# Patient Record
Sex: MALE | Employment: OTHER | URBAN - METROPOLITAN AREA
[De-identification: names, ages, dates, MRNs, and addresses within clinical notes are randomized per-mention and may not be internally consistent; named-entity substitution may affect disease eponyms.]

---

## 2022-10-20 ENCOUNTER — TELEPHONE (OUTPATIENT)
Dept: SURGERY | Age: 87
End: 2022-10-20

## 2022-10-20 NOTE — TELEPHONE ENCOUNTER
80 y/0 patient would like a phone call consultation for his visit as he does not drive and he has no family in the immediate area. His closest child lives in [facility]. Referral in media for anemia. Patient to have EGD/Colonoscopy. Referral in media  Please advise.

## 2022-10-27 NOTE — TELEPHONE ENCOUNTER
Writer called and left a voicemail for patient, we are going to schedule a telephone encounter for this patient

## 2022-10-27 NOTE — TELEPHONE ENCOUNTER
Pt called and stated he was returning a phone call to office. Writer was unable to reach clinical staff. Writer was unsure if she was to schedule appt. Please contact pt.

## 2022-11-02 RX ORDER — POTASSIUM CHLORIDE 750 MG/1
TABLET, FILM COATED, EXTENDED RELEASE ORAL
COMMUNITY
Start: 2022-09-07

## 2022-11-02 RX ORDER — TIOTROPIUM BROMIDE INHALATION SPRAY 1.56 UG/1
SPRAY, METERED RESPIRATORY (INHALATION)
COMMUNITY
Start: 2022-09-07

## 2022-11-02 RX ORDER — LOSARTAN POTASSIUM AND HYDROCHLOROTHIAZIDE 12.5; 1 MG/1; MG/1
TABLET ORAL
COMMUNITY
Start: 2022-09-07

## 2022-11-02 RX ORDER — OMEPRAZOLE 20 MG/1
CAPSULE, DELAYED RELEASE ORAL
COMMUNITY
Start: 2022-09-07

## 2022-11-02 RX ORDER — NIFEDIPINE 90 MG/1
TABLET, FILM COATED, EXTENDED RELEASE ORAL
COMMUNITY
Start: 2022-09-07

## 2022-11-03 ENCOUNTER — SCHEDULED TELEPHONE ENCOUNTER (OUTPATIENT)
Dept: GASTROENTEROLOGY | Age: 87
End: 2022-11-03
Payer: MEDICARE

## 2022-11-03 DIAGNOSIS — E61.1 IRON DEFICIENCY: Primary | ICD-10-CM

## 2022-11-03 PROCEDURE — 99212 OFFICE O/P EST SF 10 MIN: CPT | Performed by: INTERNAL MEDICINE

## 2022-11-03 PROCEDURE — 1123F ACP DISCUSS/DSCN MKR DOCD: CPT | Performed by: INTERNAL MEDICINE

## 2022-11-03 NOTE — PROGRESS NOTES
VIRTUAL VISIT:      Saw Cook is a 80 y.o. male evaluated via telephone on 11/3/2022. Consent:  He and/or health care decision maker is aware that that he may receive a bill for this telephone service, depending on his insurance coverage, and has provided verbal consent to proceed: Yes    Agree with ROS as documented and detailed by MA/LPN in separate note from today's encounter     Documentation:  I communicated with the patient and/or health care decision maker about his anemia. Details of this discussion including any medical advice provided:     Last colonoscopy in 2006  [place]   Patient has no transportation  Unable to be present for clinic visit  Unable to be present for lab work   Unable to arrange transportation for procedures. Difficult to plan work up. No family or friends around  Patient denies any GI symptoms currently  No blood in the stool  Will discuss with social work with regards to arranging blood work/lab work   Recommend fecal occult testing. I affirm this is a Patient Initiated Episode with an Established Patient who has not had a related appointment within my department in the past 7 days or scheduled within the next 24 hours. Total Time: minutes: 11-20 minutes    Saw Cook is a 80 y.o. male being evaluated by a Virtual Visit (telephone visit) encounter to address concerns as mentioned above. A caregiver was present when appropriate. Due to this being a TeleHealth encounter (During TKUPS-91 public health emergency), evaluation of the following organ systems was limited: Vitals/Constitutional/EENT/Resp/CV/GI//MS/Neuro/Skin/Heme-Lymph-Imm.   Pursuant to the emergency declaration under the [facility], [address] [place] [address] authority and the [employer] Act, this Virtual Visit was conducted with patient's (and/or legal guardian's) consent, to reduce the patient's risk of exposure to COVID-19 and provide necessary medical care. The patient (and/or legal guardian) has also been advised to contact this office for worsening conditions or problems, and seek emergency medical treatment and/or call 911 if deemed necessary. Services were provided through a telephone synchronous discussion virtually to substitute for in-person clinic visit. Patient and provider were located at their individual homes. --Phan Wolff MD on 11/3/2022 at 4:30 PM    An electronic signature was used to authenticate this note.        Phan Wolff MD   Gastroenterology

## 2024-05-28 ENCOUNTER — HOSPITAL ENCOUNTER (INPATIENT)
Age: 89
LOS: 7 days | Discharge: NURSING, REHAB OR LONG-TERM CARE | DRG: 808 | End: 2024-06-04
Attending: EMERGENCY MEDICINE | Admitting: STUDENT IN AN ORGANIZED HEALTH CARE EDUCATION/TRAINING PROGRAM
Payer: MEDICARE

## 2024-05-28 ENCOUNTER — HOSPITAL ENCOUNTER (OUTPATIENT)
Age: 89
Discharge: HOME | End: 2024-05-28
Payer: MEDICARE

## 2024-05-28 ENCOUNTER — APPOINTMENT (OUTPATIENT)
Dept: GENERAL RADIOLOGY | Age: 89
DRG: 808 | End: 2024-05-28
Payer: MEDICARE

## 2024-05-28 ENCOUNTER — HOSPITAL ENCOUNTER (OUTPATIENT)
Dept: GENERAL RADIOLOGY | Age: 89
Discharge: HOME | End: 2024-05-30
Payer: MEDICARE

## 2024-05-28 DIAGNOSIS — N17.9 AKI (ACUTE KIDNEY INJURY) (HCC): ICD-10-CM

## 2024-05-28 DIAGNOSIS — D70.9 NEUTROPENIA, UNSPECIFIED TYPE (HCC): Primary | ICD-10-CM

## 2024-05-28 DIAGNOSIS — R79.89 ELEVATED TROPONIN: ICD-10-CM

## 2024-05-28 DIAGNOSIS — D64.9 ANEMIA, UNSPECIFIED TYPE: ICD-10-CM

## 2024-05-28 DIAGNOSIS — R06.02 SHORTNESS OF BREATH: ICD-10-CM

## 2024-05-28 DIAGNOSIS — R79.89 ELEVATED BRAIN NATRIURETIC PEPTIDE (BNP) LEVEL: ICD-10-CM

## 2024-05-28 PROBLEM — J44.9 COPD (CHRONIC OBSTRUCTIVE PULMONARY DISEASE) (HCC): Status: ACTIVE | Noted: 2024-05-28

## 2024-05-28 PROBLEM — D61.818 PANCYTOPENIA (HCC): Status: ACTIVE | Noted: 2024-05-28

## 2024-05-28 PROBLEM — I10 HYPERTENSION: Status: ACTIVE | Noted: 2024-05-28

## 2024-05-28 LAB
ALBUMIN SERPL-MCNC: 4 G/DL (ref 3.5–5.2)
ALBUMIN/GLOB SERPL: 2 {RATIO} (ref 1–2.5)
ALP SERPL-CCNC: 71 U/L (ref 40–129)
ALT SERPL-CCNC: 16 U/L (ref 10–50)
ANION GAP SERPL CALCULATED.3IONS-SCNC: 15 MMOL/L (ref 9–16)
AST SERPL-CCNC: 21 U/L (ref 10–50)
BASOPHILS # BLD: 0 K/UL (ref 0–0.2)
BASOPHILS NFR BLD: 0 % (ref 0–2)
BILIRUB SERPL-MCNC: 0.6 MG/DL (ref 0–1.2)
BNP SERPL-MCNC: 1010 PG/ML (ref 0–300)
BUN SERPL-MCNC: 42 MG/DL (ref 8–23)
CALCIUM SERPL-MCNC: 9.3 MG/DL (ref 8.6–10.4)
CHLORIDE SERPL-SCNC: 104 MMOL/L (ref 98–107)
CO2 SERPL-SCNC: 21 MMOL/L (ref 20–31)
CREAT SERPL-MCNC: 2.5 MG/DL (ref 0.7–1.2)
D DIMER PPP FEU-MCNC: 1.21 UG/ML FEU
EOSINOPHIL # BLD: 0.02 K/UL (ref 0–0.4)
EOSINOPHILS RELATIVE PERCENT: 2 % (ref 1–4)
ERYTHROCYTE [DISTWIDTH] IN BLOOD BY AUTOMATED COUNT: 16.3 % (ref 11.8–14.4)
GFR, ESTIMATED: 23 ML/MIN/1.73M2
GLUCOSE SERPL-MCNC: 105 MG/DL (ref 74–99)
HCT VFR BLD AUTO: 25 % (ref 40.7–50.3)
HGB BLD-MCNC: 7.8 G/DL (ref 13–17)
IMM GRANULOCYTES # BLD AUTO: 0.02 K/UL (ref 0–0.3)
IMM GRANULOCYTES NFR BLD: 2 %
LYMPHOCYTES NFR BLD: 0.68 K/UL (ref 1–4.8)
LYMPHOCYTES RELATIVE PERCENT: 62 % (ref 24–44)
MAGNESIUM SERPL-MCNC: 2.2 MG/DL (ref 1.7–2.3)
MCH RBC QN AUTO: 33.2 PG (ref 25.2–33.5)
MCHC RBC AUTO-ENTMCNC: 31.2 G/DL (ref 28.4–34.8)
MCV RBC AUTO: 106.4 FL (ref 82.6–102.9)
MONOCYTES NFR BLD: 0.07 K/UL (ref 0.1–0.8)
MONOCYTES NFR BLD: 6 % (ref 1–7)
MORPHOLOGY: ABNORMAL
MORPHOLOGY: ABNORMAL
NEUTROPHILS NFR BLD: 28 % (ref 36–66)
NEUTS SEG NFR BLD: 0.31 K/UL (ref 1.8–7.7)
NRBC BLD-RTO: 1.8 PER 100 WBC
PLATELET # BLD AUTO: 135 K/UL (ref 138–453)
PMV BLD AUTO: 10.4 FL (ref 8.1–13.5)
POTASSIUM SERPL-SCNC: 4.5 MMOL/L (ref 3.7–5.3)
PROT SERPL-MCNC: 6.5 G/DL (ref 6.6–8.7)
RBC # BLD AUTO: 2.35 M/UL (ref 4.21–5.77)
SODIUM SERPL-SCNC: 140 MMOL/L (ref 136–145)
T4 FREE SERPL-MCNC: 1.4 NG/DL (ref 0.92–1.68)
TROPONIN I SERPL HS-MCNC: 49 NG/L (ref 0–22)
TROPONIN I SERPL HS-MCNC: 53 NG/L (ref 0–22)
TSH SERPL DL<=0.05 MIU/L-ACNC: 0.73 UIU/ML (ref 0.3–5)
WBC OTHER # BLD: 1.1 K/UL (ref 3.5–11.3)

## 2024-05-28 PROCEDURE — 86301 IMMUNOASSAY TUMOR CA 19-9: CPT

## 2024-05-28 PROCEDURE — 83735 ASSAY OF MAGNESIUM: CPT

## 2024-05-28 PROCEDURE — 84300 ASSAY OF URINE SODIUM: CPT

## 2024-05-28 PROCEDURE — 71046 X-RAY EXAM CHEST 2 VIEWS: CPT

## 2024-05-28 PROCEDURE — 84484 ASSAY OF TROPONIN QUANT: CPT

## 2024-05-28 PROCEDURE — 99285 EMERGENCY DEPT VISIT HI MDM: CPT

## 2024-05-28 PROCEDURE — 85379 FIBRIN DEGRADATION QUANT: CPT

## 2024-05-28 PROCEDURE — 1200000000 HC SEMI PRIVATE

## 2024-05-28 PROCEDURE — 84156 ASSAY OF PROTEIN URINE: CPT

## 2024-05-28 PROCEDURE — 71045 X-RAY EXAM CHEST 1 VIEW: CPT

## 2024-05-28 PROCEDURE — 83550 IRON BINDING TEST: CPT

## 2024-05-28 PROCEDURE — 83880 ASSAY OF NATRIURETIC PEPTIDE: CPT

## 2024-05-28 PROCEDURE — 81001 URINALYSIS AUTO W/SCOPE: CPT

## 2024-05-28 PROCEDURE — 93005 ELECTROCARDIOGRAM TRACING: CPT | Performed by: NURSE PRACTITIONER

## 2024-05-28 PROCEDURE — 84439 ASSAY OF FREE THYROXINE: CPT

## 2024-05-28 PROCEDURE — 83540 ASSAY OF IRON: CPT

## 2024-05-28 PROCEDURE — 99222 1ST HOSP IP/OBS MODERATE 55: CPT

## 2024-05-28 PROCEDURE — 36415 COLL VENOUS BLD VENIPUNCTURE: CPT

## 2024-05-28 PROCEDURE — 2580000003 HC RX 258: Performed by: NURSE PRACTITIONER

## 2024-05-28 PROCEDURE — 84443 ASSAY THYROID STIM HORMONE: CPT

## 2024-05-28 PROCEDURE — 80053 COMPREHEN METABOLIC PANEL: CPT

## 2024-05-28 PROCEDURE — 2580000003 HC RX 258

## 2024-05-28 PROCEDURE — 85025 COMPLETE CBC W/AUTO DIFF WBC: CPT

## 2024-05-28 RX ORDER — ONDANSETRON 4 MG/1
4 TABLET, ORALLY DISINTEGRATING ORAL EVERY 8 HOURS PRN
Status: DISCONTINUED | OUTPATIENT
Start: 2024-05-28 | End: 2024-06-04 | Stop reason: HOSPADM

## 2024-05-28 RX ORDER — TAMSULOSIN HYDROCHLORIDE 0.4 MG/1
0.4 CAPSULE ORAL DAILY
Status: DISCONTINUED | OUTPATIENT
Start: 2024-05-29 | End: 2024-06-04 | Stop reason: HOSPADM

## 2024-05-28 RX ORDER — ONDANSETRON 2 MG/ML
4 INJECTION INTRAMUSCULAR; INTRAVENOUS EVERY 6 HOURS PRN
Status: DISCONTINUED | OUTPATIENT
Start: 2024-05-28 | End: 2024-06-04 | Stop reason: HOSPADM

## 2024-05-28 RX ORDER — PANTOPRAZOLE SODIUM 40 MG/1
40 TABLET, DELAYED RELEASE ORAL
Status: DISCONTINUED | OUTPATIENT
Start: 2024-05-29 | End: 2024-05-29

## 2024-05-28 RX ORDER — SODIUM CHLORIDE 9 MG/ML
INJECTION, SOLUTION INTRAVENOUS CONTINUOUS
Status: DISCONTINUED | OUTPATIENT
Start: 2024-05-28 | End: 2024-05-29

## 2024-05-28 RX ORDER — NIFEDIPINE 30 MG/1
90 TABLET, EXTENDED RELEASE ORAL DAILY
Status: DISCONTINUED | OUTPATIENT
Start: 2024-05-29 | End: 2024-05-29

## 2024-05-28 RX ORDER — 0.9 % SODIUM CHLORIDE 0.9 %
500 INTRAVENOUS SOLUTION INTRAVENOUS ONCE
Status: COMPLETED | OUTPATIENT
Start: 2024-05-28 | End: 2024-05-28

## 2024-05-28 RX ADMIN — SODIUM CHLORIDE: 9 INJECTION, SOLUTION INTRAVENOUS at 23:15

## 2024-05-28 RX ADMIN — SODIUM CHLORIDE 500 ML: 9 INJECTION, SOLUTION INTRAVENOUS at 20:31

## 2024-05-28 ASSESSMENT — PAIN - FUNCTIONAL ASSESSMENT: PAIN_FUNCTIONAL_ASSESSMENT: NONE - DENIES PAIN

## 2024-05-28 NOTE — ED PROVIDER NOTES
[facility] EMERGENCY DEPARTMENT  EMERGENCY DEPARTMENT ENCOUNTER      Pt Name: Shashi Rodriguez  MRN: [id number]  Birthdate 2/19/1932  Date of evaluation: 5/28/2024  Provider: KENZIE Johnson CNP  8:37 PM    CHIEF COMPLAINT       Chief Complaint   Patient presents with    Shortness of Breath     Sent to ED by PCP, abnormal labs today (low hgb, low wbc...); pt reports \"things are just slowing down\", SOB, \"blurry eyes\" X 2 weeks;          HISTORY OF PRESENT ILLNESS    Shashi Rodriguez is a 89 y.o. male who presents to the emergency department for evaluation of fatigue for 2 weeks.  States that he went to his primary care doctor and had some blood work drawn today and was called and told that it was abnormal.  No chest pain no difficulty breathing but does report occasional shortness of breath and feeling lightheaded when he changes positions.  No difficulty with bowel or bladder no abdominal pain nausea vomiting no headache.  No history of DVT PE or MI.    HPI    Nursing Notes were reviewed.    REVIEW OF SYSTEMS       Review of Systems   All other systems reviewed and are negative.      Except as noted above the remainder of the review of systems was reviewed and negative.       PAST MEDICAL HISTORY     Past Medical History:   Diagnosis Date    Hernia 1986    Hospitalized at [facility]    Kidney stones 1982; 1988    Hospitalized at [facility]    Sleep apnea 8/2006    Dr. amanda Kirby/Dr. Javier Alarcon         SURGICAL HISTORY       Past Surgical History:   Procedure Laterality Date    CATARACT REMOVAL      Right 11/2006; Left 4/2007    [facility]    TOTAL KNEE ARTHROPLASTY  10/2005    Bilateral total knee    VASECTOMY  1978    [facility] (Out[facility]         CURRENT MEDICATIONS       Previous Medications    ALFUZOSIN (UROXATRAL) 10 MG SR TABLET    Take 10 mg by mouth daily.    FUROSEMIDE (LASIX) 40 MG TABLET    Take 40 mg by mouth 2 times daily.    LOSARTAN

## 2024-05-29 ENCOUNTER — APPOINTMENT (OUTPATIENT)
Dept: ULTRASOUND IMAGING | Age: 89
DRG: 808 | End: 2024-05-29
Attending: INTERNAL MEDICINE
Payer: MEDICARE

## 2024-05-29 ENCOUNTER — APPOINTMENT (OUTPATIENT)
Dept: GENERAL RADIOLOGY | Age: 89
DRG: 808 | End: 2024-05-29
Payer: MEDICARE

## 2024-05-29 ENCOUNTER — APPOINTMENT (OUTPATIENT)
Dept: ULTRASOUND IMAGING | Age: 89
DRG: 808 | End: 2024-05-29
Payer: MEDICARE

## 2024-05-29 PROBLEM — T17.908A ASPIRATION INTO AIRWAY: Status: ACTIVE | Noted: 2024-05-29

## 2024-05-29 PROBLEM — R16.1 SPLENOMEGALY: Status: ACTIVE | Noted: 2024-05-29

## 2024-05-29 PROBLEM — N18.4 STAGE 4 CHRONIC KIDNEY DISEASE (HCC): Status: ACTIVE | Noted: 2024-05-29

## 2024-05-29 PROBLEM — R13.10 DYSPHAGIA: Status: ACTIVE | Noted: 2024-05-29

## 2024-05-29 PROBLEM — E87.20 NORMAL ANION GAP METABOLIC ACIDOSIS: Status: ACTIVE | Noted: 2024-05-29

## 2024-05-29 PROBLEM — N40.1 BENIGN PROSTATIC HYPERPLASIA WITH LOWER URINARY TRACT SYMPTOMS: Status: ACTIVE | Noted: 2024-05-29

## 2024-05-29 PROBLEM — N17.9 AKI (ACUTE KIDNEY INJURY) (HCC): Status: ACTIVE | Noted: 2024-05-29

## 2024-05-29 PROBLEM — K74.69 OTHER CIRRHOSIS OF LIVER (HCC): Status: ACTIVE | Noted: 2024-05-29

## 2024-05-29 PROBLEM — T17.908A ASPIRATION INTO AIRWAY: Status: RESOLVED | Noted: 2024-05-29 | Resolved: 2024-05-29

## 2024-05-29 LAB
ALBUMIN SERPL-MCNC: 3.4 G/DL (ref 3.5–5.2)
ALBUMIN/GLOB SERPL: 1.4 {RATIO} (ref 1–2.5)
ALP SERPL-CCNC: 63 U/L (ref 40–129)
ALT SERPL-CCNC: 14 U/L (ref 5–41)
ANION GAP SERPL CALCULATED.3IONS-SCNC: 12 MMOL/L (ref 9–17)
AST SERPL-CCNC: 16 U/L
BACTERIA URNS QL MICRO: ABNORMAL
BASOPHILS # BLD: 0 K/UL (ref 0–0.2)
BASOPHILS # BLD: 0.02 K/UL (ref 0–0.2)
BASOPHILS NFR BLD: 0 % (ref 0–2)
BASOPHILS NFR BLD: 2 % (ref 0–2)
BILIRUB SERPL-MCNC: 0.5 MG/DL (ref 0.3–1.2)
BILIRUB UR QL STRIP: NEGATIVE
BUN SERPL-MCNC: 37 MG/DL (ref 8–23)
CALCIUM SERPL-MCNC: 8.9 MG/DL (ref 8.6–10.4)
CANCER AG19-9 SERPL IA-ACNC: 8 U/ML (ref 0–35)
CELLS COUNTED: 50
CELLS COUNTED: 50
CHLORIDE SERPL-SCNC: 110 MMOL/L (ref 98–107)
CLARITY UR: CLEAR
CO2 SERPL-SCNC: 19 MMOL/L (ref 20–31)
COLOR UR: YELLOW
CREAT SERPL-MCNC: 2.1 MG/DL (ref 0.7–1.2)
EKG ATRIAL RATE: 79 BPM
EKG P AXIS: 16 DEGREES
EKG P-R INTERVAL: 210 MS
EKG Q-T INTERVAL: 402 MS
EKG QRS DURATION: 122 MS
EKG QTC CALCULATION (BAZETT): 460 MS
EKG R AXIS: 65 DEGREES
EKG T AXIS: 14 DEGREES
EKG VENTRICULAR RATE: 79 BPM
EOSINOPHIL # BLD: 0 K/UL (ref 0–0.4)
EOSINOPHIL # BLD: 0.02 K/UL (ref 0–0.4)
EOSINOPHILS RELATIVE PERCENT: 0 % (ref 1–4)
EOSINOPHILS RELATIVE PERCENT: 2 % (ref 1–4)
EPI CELLS #/AREA URNS HPF: ABNORMAL /HPF (ref 0–5)
ERYTHROCYTE [DISTWIDTH] IN BLOOD BY AUTOMATED COUNT: 16.4 % (ref 12.5–15.4)
ERYTHROCYTE [DISTWIDTH] IN BLOOD BY AUTOMATED COUNT: 16.4 % (ref 12.5–15.4)
FERRITIN SERPL-MCNC: 190 NG/ML (ref 30–400)
FOLATE SERPL-MCNC: >20 NG/ML (ref 4.8–24.2)
GFR, ESTIMATED: 29 ML/MIN/1.73M2
GLUCOSE SERPL-MCNC: 97 MG/DL (ref 70–99)
GLUCOSE UR STRIP-MCNC: NEGATIVE MG/DL
HCT VFR BLD AUTO: 22 % (ref 41–53)
HCT VFR BLD AUTO: 22 % (ref 41–53)
HGB BLD-MCNC: 7.4 G/DL (ref 13.5–17.5)
HGB BLD-MCNC: 7.4 G/DL (ref 13.5–17.5)
HGB UR QL STRIP.AUTO: NEGATIVE
IMM RETICS NFR: 19.4 % (ref 2.7–18.3)
IMM RETICS NFR: 29.3 % (ref 2.7–18.3)
IRON SATN MFR SERPL: 8 % (ref 20–55)
IRON SERPL-MCNC: 18 UG/DL (ref 61–157)
KETONES UR STRIP-MCNC: NEGATIVE MG/DL
LDH SERPL-CCNC: 225 U/L (ref 135–225)
LEUKOCYTE ESTERASE UR QL STRIP: NEGATIVE
LYMPHOCYTES NFR BLD: 0.66 K/UL (ref 1–4.8)
LYMPHOCYTES NFR BLD: 0.79 K/UL (ref 1–4.8)
LYMPHOCYTES RELATIVE PERCENT: 66 % (ref 24–44)
LYMPHOCYTES RELATIVE PERCENT: 72 % (ref 24–44)
MAGNESIUM SERPL-MCNC: 2.1 MG/DL (ref 1.6–2.6)
MCH RBC QN AUTO: 34 PG (ref 26–34)
MCH RBC QN AUTO: 34.1 PG (ref 26–34)
MCHC RBC AUTO-ENTMCNC: 33.5 G/DL (ref 31–37)
MCHC RBC AUTO-ENTMCNC: 33.7 G/DL (ref 31–37)
MCV RBC AUTO: 101.2 FL (ref 80–100)
MCV RBC AUTO: 101.3 FL (ref 80–100)
MONOCYTES NFR BLD: 0.09 K/UL (ref 0.1–0.8)
MONOCYTES NFR BLD: 0.12 K/UL (ref 0.1–0.8)
MONOCYTES NFR BLD: 12 % (ref 1–7)
MONOCYTES NFR BLD: 8 % (ref 1–7)
MORPHOLOGY: ABNORMAL
MORPHOLOGY: ABNORMAL
MYELOCYTES ABSOLUTE COUNT: 0.02 K/UL
MYELOCYTES: 2 %
NEUTROPHILS NFR BLD: 16 % (ref 36–66)
NEUTROPHILS NFR BLD: 20 % (ref 36–66)
NEUTS SEG NFR BLD: 0.18 K/UL (ref 1.8–7.7)
NEUTS SEG NFR BLD: 0.2 K/UL (ref 1.8–7.7)
NITRITE UR QL STRIP: NEGATIVE
NUCLEATED RED BLOOD CELLS: 2 PER 100 WBC
PH UR STRIP: 6 [PH] (ref 5–8)
PLATELET # BLD AUTO: 149 K/UL (ref 140–450)
PLATELET # BLD AUTO: 149 K/UL (ref 140–450)
PMV BLD AUTO: 7.9 FL (ref 6–12)
PMV BLD AUTO: 7.9 FL (ref 6–12)
POTASSIUM SERPL-SCNC: 4.7 MMOL/L (ref 3.7–5.3)
PROT SERPL-MCNC: 5.9 G/DL (ref 6.4–8.3)
PROT UR STRIP-MCNC: NEGATIVE MG/DL
PSA SERPL-MCNC: 7.5 NG/ML (ref 0–4)
RBC # BLD AUTO: 2.17 M/UL (ref 4.5–5.9)
RBC # BLD AUTO: 2.18 M/UL (ref 4.5–5.9)
RBC #/AREA URNS HPF: ABNORMAL /HPF (ref 0–2)
RETIC HEMOGLOBIN: 24.8 PG (ref 28.2–35.7)
RETIC HEMOGLOBIN: 25.5 PG (ref 28.2–35.7)
RETICS # AUTO: 0.06 M/UL (ref 0.03–0.08)
RETICS # AUTO: 0.07 M/UL (ref 0.03–0.08)
RETICS/RBC NFR AUTO: 2.6 % (ref 0.5–1.9)
RETICS/RBC NFR AUTO: 3 % (ref 0.5–1.9)
SODIUM SERPL-SCNC: 141 MMOL/L (ref 135–144)
SODIUM UR-SCNC: 93 MMOL/L
SP GR UR STRIP: 1.01 (ref 1–1.03)
TIBC SERPL-MCNC: 237 UG/DL (ref 250–450)
TOTAL PROTEIN, URINE: <4 MG/DL
UNSATURATED IRON BINDING CAPACITY: 219 UG/DL (ref 112–347)
UROBILINOGEN UR STRIP-ACNC: NORMAL EU/DL (ref 0–1)
VIT B12 SERPL-MCNC: 1315 PG/ML (ref 232–1245)
WBC #/AREA URNS HPF: ABNORMAL /HPF (ref 0–5)
WBC OTHER # BLD: 1 K/UL (ref 3.5–11)
WBC OTHER # BLD: 1.1 K/UL (ref 3.5–11)

## 2024-05-29 PROCEDURE — 51798 US URINE CAPACITY MEASURE: CPT

## 2024-05-29 PROCEDURE — 6360000002 HC RX W HCPCS: Performed by: STUDENT IN AN ORGANIZED HEALTH CARE EDUCATION/TRAINING PROGRAM

## 2024-05-29 PROCEDURE — 99223 1ST HOSP IP/OBS HIGH 75: CPT | Performed by: INTERNAL MEDICINE

## 2024-05-29 PROCEDURE — 71045 X-RAY EXAM CHEST 1 VIEW: CPT

## 2024-05-29 PROCEDURE — 83615 LACTATE (LD) (LDH) ENZYME: CPT

## 2024-05-29 PROCEDURE — 85045 AUTOMATED RETICULOCYTE COUNT: CPT

## 2024-05-29 PROCEDURE — 84165 PROTEIN E-PHORESIS SERUM: CPT

## 2024-05-29 PROCEDURE — G0103 PSA SCREENING: HCPCS

## 2024-05-29 PROCEDURE — 6370000000 HC RX 637 (ALT 250 FOR IP)

## 2024-05-29 PROCEDURE — 82607 VITAMIN B-12: CPT

## 2024-05-29 PROCEDURE — 99232 SBSQ HOSP IP/OBS MODERATE 35: CPT | Performed by: STUDENT IN AN ORGANIZED HEALTH CARE EDUCATION/TRAINING PROGRAM

## 2024-05-29 PROCEDURE — 83735 ASSAY OF MAGNESIUM: CPT

## 2024-05-29 PROCEDURE — 82728 ASSAY OF FERRITIN: CPT

## 2024-05-29 PROCEDURE — 84155 ASSAY OF PROTEIN SERUM: CPT

## 2024-05-29 PROCEDURE — 36415 COLL VENOUS BLD VENIPUNCTURE: CPT

## 2024-05-29 PROCEDURE — 87040 BLOOD CULTURE FOR BACTERIA: CPT

## 2024-05-29 PROCEDURE — 83521 IG LIGHT CHAINS FREE EACH: CPT

## 2024-05-29 PROCEDURE — 80053 COMPREHEN METABOLIC PANEL: CPT

## 2024-05-29 PROCEDURE — 85025 COMPLETE CBC W/AUTO DIFF WBC: CPT

## 2024-05-29 PROCEDURE — 1200000000 HC SEMI PRIVATE

## 2024-05-29 PROCEDURE — 6370000000 HC RX 637 (ALT 250 FOR IP): Performed by: STUDENT IN AN ORGANIZED HEALTH CARE EDUCATION/TRAINING PROGRAM

## 2024-05-29 PROCEDURE — 84145 PROCALCITONIN (PCT): CPT

## 2024-05-29 PROCEDURE — 76705 ECHO EXAM OF ABDOMEN: CPT

## 2024-05-29 PROCEDURE — 94761 N-INVAS EAR/PLS OXIMETRY MLT: CPT

## 2024-05-29 PROCEDURE — 76770 US EXAM ABDO BACK WALL COMP: CPT

## 2024-05-29 PROCEDURE — 82746 ASSAY OF FOLIC ACID SERUM: CPT

## 2024-05-29 PROCEDURE — 92610 EVALUATE SWALLOWING FUNCTION: CPT

## 2024-05-29 PROCEDURE — 94640 AIRWAY INHALATION TREATMENT: CPT

## 2024-05-29 PROCEDURE — 88184 FLOWCYTOMETRY/ TC 1 MARKER: CPT

## 2024-05-29 PROCEDURE — 51702 INSERT TEMP BLADDER CATH: CPT

## 2024-05-29 PROCEDURE — 86334 IMMUNOFIX E-PHORESIS SERUM: CPT

## 2024-05-29 PROCEDURE — 2580000003 HC RX 258: Performed by: STUDENT IN AN ORGANIZED HEALTH CARE EDUCATION/TRAINING PROGRAM

## 2024-05-29 PROCEDURE — 88185 FLOWCYTOMETRY/TC ADD-ON: CPT

## 2024-05-29 RX ORDER — 0.9 % SODIUM CHLORIDE 0.9 %
500 INTRAVENOUS SOLUTION INTRAVENOUS ONCE
Status: COMPLETED | OUTPATIENT
Start: 2024-05-29 | End: 2024-05-29

## 2024-05-29 RX ORDER — GUAIFENESIN AND DEXTROMETHORPHAN HYDROBROMIDE 100; 10 MG/5ML; MG/5ML
5 SOLUTION ORAL EVERY 4 HOURS PRN
Status: DISCONTINUED | OUTPATIENT
Start: 2024-05-29 | End: 2024-06-04 | Stop reason: HOSPADM

## 2024-05-29 RX ORDER — SODIUM CHLORIDE 450 MG/100ML
INJECTION, SOLUTION INTRAVENOUS CONTINUOUS
Status: DISCONTINUED | OUTPATIENT
Start: 2024-05-29 | End: 2024-05-29

## 2024-05-29 RX ORDER — MIDODRINE HYDROCHLORIDE 5 MG/1
5 TABLET ORAL 3 TIMES DAILY PRN
Status: DISCONTINUED | OUTPATIENT
Start: 2024-05-29 | End: 2024-06-04 | Stop reason: HOSPADM

## 2024-05-29 RX ORDER — MIDODRINE HYDROCHLORIDE 2.5 MG/1
TABLET ORAL
Status: DISPENSED
Start: 2024-05-29 | End: 2024-05-29

## 2024-05-29 RX ADMIN — CEFEPIME 1000 MG: 1 INJECTION, POWDER, FOR SOLUTION INTRAMUSCULAR; INTRAVENOUS at 22:14

## 2024-05-29 RX ADMIN — CEFEPIME 2000 MG: 2 INJECTION, POWDER, FOR SOLUTION INTRAVENOUS at 09:06

## 2024-05-29 RX ADMIN — TAMSULOSIN HYDROCHLORIDE 0.4 MG: 0.4 CAPSULE ORAL at 08:01

## 2024-05-29 RX ADMIN — SODIUM CHLORIDE 500 ML: 9 INJECTION, SOLUTION INTRAVENOUS at 07:56

## 2024-05-29 RX ADMIN — PANTOPRAZOLE SODIUM 40 MG: 40 TABLET, DELAYED RELEASE ORAL at 06:16

## 2024-05-29 RX ADMIN — GUAIFENESIN AND DEXTROMETHORPHAN 5 ML: 20; 200 SYRUP ORAL at 22:19

## 2024-05-29 RX ADMIN — TIOTROPIUM BROMIDE INHALATION SPRAY 1 PUFF: 3.12 SPRAY, METERED RESPIRATORY (INHALATION) at 08:10

## 2024-05-29 RX ADMIN — MIDODRINE HYDROCHLORIDE 5 MG: 2.5 TABLET ORAL at 08:01

## 2024-05-29 RX ADMIN — SODIUM CHLORIDE: 4.5 INJECTION, SOLUTION INTRAVENOUS at 09:01

## 2024-05-29 NOTE — ED PROVIDER NOTES
[facility] Emergency Department  [address]  [facility] [address]  Phone: [phone]  Fax: [phone]      Attending Physician Attestation          CHIEF COMPLAINT       Chief Complaint   Patient presents with    Shortness of Breath     Sent to ED by PCP, abnormal labs today (low hgb, low wbc...); pt reports \"things are just slowing down\", SOB, \"blurry eyes\" X 2 weeks;        DIAGNOSTIC RESULTS     LABS:  Labs Reviewed   TROPONIN - Abnormal; Notable for the following components:       Result Value    Troponin, High Sensitivity 53 (*)     All other components within normal limits   TROPONIN - Abnormal; Notable for the following components:    Troponin, High Sensitivity 49 (*)     All other components within normal limits   TSH   T4, FREE   URINALYSIS WITH REFLEX TO CULTURE       All other labs were within normal range or not returned as of this dictation.    RADIOLOGY:  XR CHEST PORTABLE   Final Result   No acute cardiopulmonary disease.               EMERGENCY DEPARTMENT COURSE:   Vitals:    Vitals:    05/28/24 2028 05/28/24 2030 05/28/24 2050 05/28/24 2120   BP: (!) 101/50 (!) 101/50 (!) 96/51 (!) 111/54   Pulse:  75 74 83   Resp:  15 13 26   Temp:       TempSrc:       SpO2:  95% 96% 93%   Weight:       Height:         -------------------------  BP: (!) 111/54, Temp: 98.1 °F (36.7 °C), Pulse: 83, Respirations: 26             PERTINENT ATTENDING PHYSICIAN COMMENTS:    I performed a history and physical examination of the patient and discussed management with the mid level provider. I reviewed the mid level provider's note and agree with the documented findings and plan of care. Any areas of disagreement are noted on the chart. I was personally present for the key portions of any procedures. I have documented in the chart those procedures where I was not present during the key portions. I have reviewed the emergency nurses triage note. I agree with the chief complaint, past

## 2024-05-29 NOTE — H&P
Metabolic Panel    Collection Time: 05/28/24 11:54 AM   Result Value Ref Range    Sodium 140 136 - 145 mmol/L    Potassium 4.5 3.7 - 5.3 mmol/L    Chloride 104 98 - 107 mmol/L    CO2 21 20 - 31 mmol/L    Anion Gap 15 9 - 16 mmol/L    Glucose 105 (H) 74 - 99 mg/dL    BUN 42 (H) 8 - 23 mg/dL    Creatinine 2.5 (H) 0.70 - 1.20 mg/dL    Est, Glom Filt Rate 23 (L) >60 mL/min/1.73m2    Calcium 9.3 8.6 - 10.4 mg/dL    Total Protein 6.5 (L) 6.6 - 8.7 g/dL    Albumin 4.0 3.5 - 5.2 g/dL    Albumin/Globulin Ratio 2.0 1.0 - 2.5    Total Bilirubin 0.6 0.00 - 1.20 mg/dL    Alkaline Phosphatase 71 40 - 129 U/L    ALT 16 10 - 50 U/L    AST 21 10 - 50 U/L   D-Dimer, Quantitative    Collection Time: 05/28/24 11:54 AM   Result Value Ref Range    D-Dimer, Quant 1.21 ug/mL FEU   Magnesium    Collection Time: 05/28/24 11:54 AM   Result Value Ref Range    Magnesium 2.2 1.7 - 2.3 mg/dL   EKG 12 Lead (Chest Pain)    Collection Time: 05/28/24  7:37 PM   Result Value Ref Range    Ventricular Rate 79 BPM    Atrial Rate 79 BPM    P-R Interval 210 ms    QRS Duration 122 ms    Q-T Interval 402 ms    QTc Calculation (Bazett) 460 ms    P Axis 16 degrees    R Axis 65 degrees    T Axis 14 degrees   Troponin    Collection Time: 05/28/24  7:41 PM   Result Value Ref Range    Troponin, High Sensitivity 53 (HH) 0 - 22 ng/L   TSH    Collection Time: 05/28/24  7:41 PM   Result Value Ref Range    TSH 0.73 0.30 - 5.00 uIU/mL   T4, Free    Collection Time: 05/28/24  7:41 PM   Result Value Ref Range    T4 Free 1.4 0.92 - 1.68 ng/dL   Troponin    Collection Time: 05/28/24  9:08 PM   Result Value Ref Range    Troponin, High Sensitivity 49 (H) 0 - 22 ng/L   Urinalysis with microscopic    Collection Time: 05/28/24 11:45 PM   Result Value Ref Range    Color, UA Yellow Yellow    Turbidity UA Clear Clear    Glucose, Ur NEGATIVE NEGATIVE mg/dL    Bilirubin, Urine NEGATIVE NEGATIVE    Ketones, Urine NEGATIVE NEGATIVE mg/dL    Specific Gravity, UA 1.010 1.005 - 1.030

## 2024-05-30 ENCOUNTER — APPOINTMENT (OUTPATIENT)
Age: 89
DRG: 808 | End: 2024-05-30
Attending: STUDENT IN AN ORGANIZED HEALTH CARE EDUCATION/TRAINING PROGRAM
Payer: MEDICARE

## 2024-05-30 PROBLEM — D64.9 ANEMIA: Status: ACTIVE | Noted: 2024-05-28

## 2024-05-30 PROBLEM — D70.9 NEUTROPENIA (HCC): Status: ACTIVE | Noted: 2024-05-30

## 2024-05-30 LAB
ANION GAP SERPL CALCULATED.3IONS-SCNC: 12 MMOL/L (ref 9–17)
BASOPHILS # BLD: 0 K/UL (ref 0–0.2)
BASOPHILS NFR BLD: 0 % (ref 0–2)
BUN SERPL-MCNC: 28 MG/DL (ref 8–23)
CALCIUM SERPL-MCNC: 9.1 MG/DL (ref 8.6–10.4)
CHLORIDE SERPL-SCNC: 109 MMOL/L (ref 98–107)
CO2 SERPL-SCNC: 20 MMOL/L (ref 20–31)
CREAT SERPL-MCNC: 1.6 MG/DL (ref 0.7–1.2)
ECHO AO ROOT DIAM: 4.1 CM
ECHO AO ROOT INDEX: 2.09 CM/M2
ECHO AV AREA PEAK VELOCITY: 3.1 CM2
ECHO AV AREA VTI: 3.4 CM2
ECHO AV AREA/BSA PEAK VELOCITY: 1.6 CM2/M2
ECHO AV AREA/BSA VTI: 1.7 CM2/M2
ECHO AV MEAN GRADIENT: 9 MMHG
ECHO AV MEAN VELOCITY: 1.4 M/S
ECHO AV PEAK GRADIENT: 15 MMHG
ECHO AV PEAK VELOCITY: 1.9 M/S
ECHO AV VELOCITY RATIO: 0.84
ECHO AV VTI: 37.9 CM
ECHO BSA: 1.99 M2
ECHO EST RA PRESSURE: 8 MMHG
ECHO IVC EXP: 2.4 CM
ECHO IVC INSP: 0.9 CM
ECHO LA AREA 2C: 20.4 CM2
ECHO LA AREA 4C: 22.4 CM2
ECHO LA DIAMETER INDEX: 2.4 CM/M2
ECHO LA DIAMETER: 4.7 CM
ECHO LA MAJOR AXIS: 5.7 CM
ECHO LA MINOR AXIS: 5.9 CM
ECHO LA TO AORTIC ROOT RATIO: 1.15
ECHO LA VOL BP: 64 ML (ref 18–58)
ECHO LA VOL MOD A2C: 57 ML (ref 18–58)
ECHO LA VOL MOD A4C: 71 ML (ref 18–58)
ECHO LA VOL/BSA BIPLANE: 33 ML/M2 (ref 16–34)
ECHO LA VOLUME INDEX MOD A2C: 29 ML/M2 (ref 16–34)
ECHO LA VOLUME INDEX MOD A4C: 36 ML/M2 (ref 16–34)
ECHO LV E' LATERAL VELOCITY: 10 CM/S
ECHO LV E' SEPTAL VELOCITY: 8 CM/S
ECHO LV FRACTIONAL SHORTENING: 36 % (ref 28–44)
ECHO LV INTERNAL DIMENSION DIASTOLE INDEX: 2.14 CM/M2
ECHO LV INTERNAL DIMENSION DIASTOLIC: 4.2 CM (ref 4.2–5.9)
ECHO LV INTERNAL DIMENSION SYSTOLIC INDEX: 1.38 CM/M2
ECHO LV INTERNAL DIMENSION SYSTOLIC: 2.7 CM
ECHO LV IVSD: 1 CM (ref 0.6–1)
ECHO LV MASS 2D: 137.2 G (ref 88–224)
ECHO LV MASS INDEX 2D: 70 G/M2 (ref 49–115)
ECHO LV POSTERIOR WALL DIASTOLIC: 1 CM (ref 0.6–1)
ECHO LV RELATIVE WALL THICKNESS RATIO: 0.48
ECHO LVOT AREA: 3.8 CM2
ECHO LVOT AV VTI INDEX: 0.89
ECHO LVOT DIAM: 2.2 CM
ECHO LVOT MEAN GRADIENT: 6 MMHG
ECHO LVOT PEAK GRADIENT: 10 MMHG
ECHO LVOT PEAK VELOCITY: 1.6 M/S
ECHO LVOT STROKE VOLUME INDEX: 65.3 ML/M2
ECHO LVOT SV: 128 ML
ECHO LVOT VTI: 33.7 CM
ECHO MV A VELOCITY: 1.12 M/S
ECHO MV E VELOCITY: 0.64 M/S
ECHO MV E/A RATIO: 0.57
ECHO MV E/E' LATERAL: 6.4
ECHO MV E/E' RATIO (AVERAGED): 7.2
ECHO MV E/E' SEPTAL: 8
ECHO RIGHT VENTRICULAR SYSTOLIC PRESSURE (RVSP): 43 MMHG
ECHO RV BASAL DIMENSION: 4.9 CM
ECHO RV FREE WALL PEAK S': 18 CM/S
ECHO TV REGURGITANT MAX VELOCITY: 2.97 M/S
ECHO TV REGURGITANT PEAK GRADIENT: 35 MMHG
EOSINOPHIL # BLD: 0.02 K/UL (ref 0–0.4)
EOSINOPHILS RELATIVE PERCENT: 2 % (ref 1–4)
ERYTHROCYTE [DISTWIDTH] IN BLOOD BY AUTOMATED COUNT: 16.2 % (ref 12.5–15.4)
GFR, ESTIMATED: 40 ML/MIN/1.73M2
GLUCOSE SERPL-MCNC: 90 MG/DL (ref 70–99)
HCT VFR BLD AUTO: 21.9 % (ref 41–53)
HCT VFR BLD AUTO: 23.7 % (ref 41–53)
HGB BLD-MCNC: 7.2 G/DL (ref 13.5–17.5)
HGB BLD-MCNC: 7.9 G/DL (ref 13.5–17.5)
IMM GRANULOCYTES # BLD AUTO: 0.04 K/UL (ref 0–0.3)
IMM GRANULOCYTES NFR BLD: 4 %
LYMPHOCYTES NFR BLD: 0.64 K/UL (ref 1–4.8)
LYMPHOCYTES RELATIVE PERCENT: 72 % (ref 24–44)
MAGNESIUM SERPL-MCNC: 2.1 MG/DL (ref 1.6–2.6)
MCH RBC QN AUTO: 33.3 PG (ref 26–34)
MCHC RBC AUTO-ENTMCNC: 32.7 G/DL (ref 31–37)
MCV RBC AUTO: 102.1 FL (ref 80–100)
MONOCYTES NFR BLD: 0.11 K/UL (ref 0.1–0.8)
MONOCYTES NFR BLD: 12 % (ref 1–7)
MORPHOLOGY: ABNORMAL
NEUTROPHILS NFR BLD: 10 % (ref 36–66)
NEUTS SEG NFR BLD: 0.09 K/UL (ref 1.8–7.7)
PLATELET # BLD AUTO: 133 K/UL (ref 140–450)
PMV BLD AUTO: 7.8 FL (ref 6–12)
POTASSIUM SERPL-SCNC: 4.3 MMOL/L (ref 3.7–5.3)
PROCALCITONIN SERPL-MCNC: 1.53 NG/ML (ref 0–0.09)
RBC # BLD AUTO: 2.15 M/UL (ref 4.5–5.9)
SODIUM SERPL-SCNC: 141 MMOL/L (ref 135–144)
WBC OTHER # BLD: 0.9 K/UL (ref 3.5–11)

## 2024-05-30 PROCEDURE — 97166 OT EVAL MOD COMPLEX 45 MIN: CPT

## 2024-05-30 PROCEDURE — 93306 TTE W/DOPPLER COMPLETE: CPT | Performed by: INTERNAL MEDICINE

## 2024-05-30 PROCEDURE — 6370000000 HC RX 637 (ALT 250 FOR IP): Performed by: NURSE PRACTITIONER

## 2024-05-30 PROCEDURE — 99232 SBSQ HOSP IP/OBS MODERATE 35: CPT | Performed by: INTERNAL MEDICINE

## 2024-05-30 PROCEDURE — 85014 HEMATOCRIT: CPT

## 2024-05-30 PROCEDURE — 6360000002 HC RX W HCPCS: Performed by: STUDENT IN AN ORGANIZED HEALTH CARE EDUCATION/TRAINING PROGRAM

## 2024-05-30 PROCEDURE — 36415 COLL VENOUS BLD VENIPUNCTURE: CPT

## 2024-05-30 PROCEDURE — 2580000003 HC RX 258: Performed by: STUDENT IN AN ORGANIZED HEALTH CARE EDUCATION/TRAINING PROGRAM

## 2024-05-30 PROCEDURE — C9113 INJ PANTOPRAZOLE SODIUM, VIA: HCPCS | Performed by: STUDENT IN AN ORGANIZED HEALTH CARE EDUCATION/TRAINING PROGRAM

## 2024-05-30 PROCEDURE — 83735 ASSAY OF MAGNESIUM: CPT

## 2024-05-30 PROCEDURE — 1200000000 HC SEMI PRIVATE

## 2024-05-30 PROCEDURE — 80048 BASIC METABOLIC PNL TOTAL CA: CPT

## 2024-05-30 PROCEDURE — A4216 STERILE WATER/SALINE, 10 ML: HCPCS | Performed by: STUDENT IN AN ORGANIZED HEALTH CARE EDUCATION/TRAINING PROGRAM

## 2024-05-30 PROCEDURE — 6370000000 HC RX 637 (ALT 250 FOR IP): Performed by: STUDENT IN AN ORGANIZED HEALTH CARE EDUCATION/TRAINING PROGRAM

## 2024-05-30 PROCEDURE — 93306 TTE W/DOPPLER COMPLETE: CPT

## 2024-05-30 PROCEDURE — 94640 AIRWAY INHALATION TREATMENT: CPT

## 2024-05-30 PROCEDURE — 97162 PT EVAL MOD COMPLEX 30 MIN: CPT

## 2024-05-30 PROCEDURE — 6370000000 HC RX 637 (ALT 250 FOR IP)

## 2024-05-30 PROCEDURE — 87641 MR-STAPH DNA AMP PROBE: CPT

## 2024-05-30 PROCEDURE — 85018 HEMOGLOBIN: CPT

## 2024-05-30 PROCEDURE — 85025 COMPLETE CBC W/AUTO DIFF WBC: CPT

## 2024-05-30 PROCEDURE — 97535 SELF CARE MNGMENT TRAINING: CPT

## 2024-05-30 PROCEDURE — 97116 GAIT TRAINING THERAPY: CPT

## 2024-05-30 PROCEDURE — 99232 SBSQ HOSP IP/OBS MODERATE 35: CPT | Performed by: STUDENT IN AN ORGANIZED HEALTH CARE EDUCATION/TRAINING PROGRAM

## 2024-05-30 RX ORDER — ECHINACEA PURPUREA EXTRACT 125 MG
1 TABLET ORAL PRN
Status: DISCONTINUED | OUTPATIENT
Start: 2024-05-30 | End: 2024-06-04 | Stop reason: HOSPADM

## 2024-05-30 RX ADMIN — BENZOCAINE AND MENTHOL 1 LOZENGE: 15; 3.6 LOZENGE ORAL at 21:21

## 2024-05-30 RX ADMIN — SALINE NASAL SPRAY 1 SPRAY: 1.5 SOLUTION NASAL at 21:31

## 2024-05-30 RX ADMIN — GUAIFENESIN AND DEXTROMETHORPHAN 5 ML: 20; 200 SYRUP ORAL at 21:21

## 2024-05-30 RX ADMIN — CEFEPIME 1000 MG: 1 INJECTION, POWDER, FOR SOLUTION INTRAMUSCULAR; INTRAVENOUS at 10:24

## 2024-05-30 RX ADMIN — TAMSULOSIN HYDROCHLORIDE 0.4 MG: 0.4 CAPSULE ORAL at 10:27

## 2024-05-30 RX ADMIN — TIOTROPIUM BROMIDE INHALATION SPRAY 1 PUFF: 3.12 SPRAY, METERED RESPIRATORY (INHALATION) at 09:50

## 2024-05-30 RX ADMIN — CEFEPIME 1000 MG: 1 INJECTION, POWDER, FOR SOLUTION INTRAMUSCULAR; INTRAVENOUS at 21:18

## 2024-05-30 RX ADMIN — PANTOPRAZOLE SODIUM 40 MG: 40 INJECTION, POWDER, FOR SOLUTION INTRAVENOUS at 10:25

## 2024-05-30 NOTE — CARE COORDINATION
Case Management Assessment  Initial Evaluation    Date/Time of Evaluation: 5/30/2024 4:18 PM  Assessment Completed by: Zainab Zelaya RN    If patient is discharged prior to next notation, then this note serves as note for discharge by case management.    Patient Name: Shashi Rodriguez                   YOB: 1932  Diagnosis: SOTERO (acute kidney injury) (HCC) [N17.9]  Pancytopenia (HCC) [D61.818]  Anemia, unspecified type [D64.9]  Neutropenia, unspecified type (HCC) [D70.9]                   Date / Time: 5/28/2024  7:08 PM    Patient Admission Status: Inpatient   Readmission Risk (Low < 19, Mod (19-27), High > 27): Readmission Risk Score: 15.9    Current PCP: Miguel Kamara Jr., MD  PCP verified by ? Yes    Chart Reviewed: Yes      History Provided by: Patient  Patient Orientation: Alert and Oriented    Patient Cognition: Alert    Hospitalization in the last 30 days (Readmission):  No    If yes, Readmission Assessment in  Navigator will be completed.    Advance Directives:      Code Status: DNR-CCA   Patient's Primary Decision Maker is: Legal Next of Kin      Discharge Planning:    Patient lives with: Alone Type of Home: House  Primary Care Giver: Self  Patient Support Systems include: Family Members, Children, Friends/Neighbors   Current Financial resources: Medicare  Current community resources: None  Current services prior to admission: Durable Medical Equipment            Current DME: Cpap, Walker, Cane, Shower Chair (Grab Bars)            Type of Home Care services:  None    ADLS  Prior functional level: Assistance with the following:, Housework, Shopping  Current functional level: Assistance with the following:, Housework, Shopping    PT AM-PAC: 17 /24  OT AM-PAC: 19 /24    Family can provide assistance at DC: No  Would you like Case Management to discuss the discharge plan with any other family members/significant others, and if so, who? No  Plans to Return to Present Housing: No  Other

## 2024-05-30 NOTE — CONSENT
Informed Consent for Blood Component Transfusion Note    I have discussed with the patient and sons (2) the rationale for blood component transfusion; its benefits in treating or preventing fatigue, organ damage, or death; and its risk which includes mild transfusion reactions, rare risk of blood borne infection, or more serious but rare reactions. I have discussed the alternatives to transfusion, including the risk and consequences of not receiving transfusion. The patient and sons (2) had an opportunity to ask questions and had agreed to proceed with transfusion of blood components.    Electronically signed by Keyshawn Dumont DO on 5/30/24 at 1:02 PM EDT

## 2024-05-31 PROBLEM — Z71.89 ACP (ADVANCE CARE PLANNING): Status: ACTIVE | Noted: 2024-05-31

## 2024-05-31 PROBLEM — Z51.5 PALLIATIVE CARE ENCOUNTER: Status: ACTIVE | Noted: 2024-05-31

## 2024-05-31 PROBLEM — R63.0 LOSS OF APPETITE: Status: ACTIVE | Noted: 2024-05-31

## 2024-05-31 PROBLEM — D75.89 MACROCYTOSIS: Status: ACTIVE | Noted: 2024-05-31

## 2024-05-31 LAB
ALBUMIN PERCENT: 58 % (ref 45–65)
ALBUMIN SERPL-MCNC: 3.3 G/DL (ref 3.2–5.2)
ALPHA 2 PERCENT: 15 % (ref 6–13)
ALPHA1 GLOB SERPL ELPH-MCNC: 0.4 G/DL (ref 0.1–0.4)
ALPHA1 GLOB SERPL ELPH-MCNC: 8 % (ref 3–6)
ALPHA2 GLOB SERPL ELPH-MCNC: 0.8 G/DL (ref 0.5–0.9)
ANION GAP SERPL CALCULATED.3IONS-SCNC: 11 MMOL/L (ref 9–17)
B-GLOBULIN SERPL ELPH-MCNC: 0.6 G/DL (ref 0.5–1.1)
B-GLOBULIN SERPL ELPH-MCNC: 11 % (ref 11–19)
BASOPHILS # BLD: 0 K/UL (ref 0–0.2)
BASOPHILS NFR BLD: 0 % (ref 0–2)
BUN SERPL-MCNC: 26 MG/DL (ref 8–23)
CALCIUM SERPL-MCNC: 9.1 MG/DL (ref 8.6–10.4)
CHLORIDE SERPL-SCNC: 105 MMOL/L (ref 98–107)
CO2 SERPL-SCNC: 22 MMOL/L (ref 20–31)
CREAT SERPL-MCNC: 1.3 MG/DL (ref 0.7–1.2)
EOSINOPHIL # BLD: 0.01 K/UL (ref 0–0.4)
EOSINOPHILS RELATIVE PERCENT: 1 % (ref 1–4)
ERYTHROCYTE [DISTWIDTH] IN BLOOD BY AUTOMATED COUNT: 15.8 % (ref 12.5–15.4)
FLOW CYTOMETRY BL: NORMAL
FREE KAPPA/LAMBDA RATIO: 1.3 (ref 0.22–1.74)
GAMMA GLOB SERPL ELPH-MCNC: 0.5 G/DL (ref 0.5–1.5)
GAMMA GLOBULIN %: 9 % (ref 9–20)
GFR, ESTIMATED: 52 ML/MIN/1.73M2
GLUCOSE SERPL-MCNC: 121 MG/DL (ref 70–99)
HCT VFR BLD AUTO: 21.8 % (ref 41–53)
HCT VFR BLD AUTO: 23.4 % (ref 41–53)
HGB BLD-MCNC: 7.4 G/DL (ref 13.5–17.5)
HGB BLD-MCNC: 7.8 G/DL (ref 13.5–17.5)
ITYP INTERPRETATION: ABNORMAL
KAPPA LC FREE SER-MCNC: 54 MG/L
LAMBDA LC FREE SERPL-MCNC: 41.4 MG/L (ref 4.2–27.7)
LYMPHOCYTES NFR BLD: 0.76 K/UL (ref 1–4.8)
LYMPHOCYTES RELATIVE PERCENT: 85 % (ref 24–44)
MAGNESIUM SERPL-MCNC: 1.9 MG/DL (ref 1.6–2.6)
MCH RBC QN AUTO: 34 PG (ref 26–34)
MCHC RBC AUTO-ENTMCNC: 33.9 G/DL (ref 31–37)
MCV RBC AUTO: 100.1 FL (ref 80–100)
MONOCYTES NFR BLD: 0.02 K/UL (ref 0.1–0.8)
MONOCYTES NFR BLD: 2 % (ref 1–7)
MORPHOLOGY: ABNORMAL
MRSA, DNA, NASAL: NEGATIVE
NEUTROPHILS NFR BLD: 12 % (ref 36–66)
NEUTS SEG NFR BLD: 0.11 K/UL (ref 1.8–7.7)
PATH REV: ABNORMAL
PATHOLOGIST: ABNORMAL
PLATELET # BLD AUTO: 136 K/UL (ref 140–450)
PMV BLD AUTO: 7.4 FL (ref 6–12)
POTASSIUM SERPL-SCNC: 4.4 MMOL/L (ref 3.7–5.3)
PROT PATTERN SERPL ELPH-IMP: ABNORMAL
PROT SERPL-MCNC: 5.6 G/DL (ref 6.6–8.7)
RBC # BLD AUTO: 2.18 M/UL (ref 4.5–5.9)
SODIUM SERPL-SCNC: 138 MMOL/L (ref 135–144)
SPECIMEN DESCRIPTION: NORMAL
SURGICAL PATHOLOGY REPORT: NORMAL
TOTAL PROT. SUM,%: 101 % (ref 98–102)
TOTAL PROT. SUM: 5.6 G/DL (ref 6.3–8.2)
WBC OTHER # BLD: 0.9 K/UL (ref 3.5–11)

## 2024-05-31 PROCEDURE — 99222 1ST HOSP IP/OBS MODERATE 55: CPT | Performed by: NURSE PRACTITIONER

## 2024-05-31 PROCEDURE — C9113 INJ PANTOPRAZOLE SODIUM, VIA: HCPCS | Performed by: STUDENT IN AN ORGANIZED HEALTH CARE EDUCATION/TRAINING PROGRAM

## 2024-05-31 PROCEDURE — 85014 HEMATOCRIT: CPT

## 2024-05-31 PROCEDURE — 2580000003 HC RX 258: Performed by: INTERNAL MEDICINE

## 2024-05-31 PROCEDURE — 99232 SBSQ HOSP IP/OBS MODERATE 35: CPT | Performed by: INTERNAL MEDICINE

## 2024-05-31 PROCEDURE — 6360000002 HC RX W HCPCS: Performed by: STUDENT IN AN ORGANIZED HEALTH CARE EDUCATION/TRAINING PROGRAM

## 2024-05-31 PROCEDURE — 99232 SBSQ HOSP IP/OBS MODERATE 35: CPT | Performed by: STUDENT IN AN ORGANIZED HEALTH CARE EDUCATION/TRAINING PROGRAM

## 2024-05-31 PROCEDURE — 85018 HEMOGLOBIN: CPT

## 2024-05-31 PROCEDURE — 2580000003 HC RX 258: Performed by: STUDENT IN AN ORGANIZED HEALTH CARE EDUCATION/TRAINING PROGRAM

## 2024-05-31 PROCEDURE — 94640 AIRWAY INHALATION TREATMENT: CPT

## 2024-05-31 PROCEDURE — 94761 N-INVAS EAR/PLS OXIMETRY MLT: CPT

## 2024-05-31 PROCEDURE — 36415 COLL VENOUS BLD VENIPUNCTURE: CPT

## 2024-05-31 PROCEDURE — 80048 BASIC METABOLIC PNL TOTAL CA: CPT

## 2024-05-31 PROCEDURE — A4216 STERILE WATER/SALINE, 10 ML: HCPCS | Performed by: STUDENT IN AN ORGANIZED HEALTH CARE EDUCATION/TRAINING PROGRAM

## 2024-05-31 PROCEDURE — 83735 ASSAY OF MAGNESIUM: CPT

## 2024-05-31 PROCEDURE — 6370000000 HC RX 637 (ALT 250 FOR IP)

## 2024-05-31 PROCEDURE — 6360000002 HC RX W HCPCS: Performed by: INTERNAL MEDICINE

## 2024-05-31 PROCEDURE — 85025 COMPLETE CBC W/AUTO DIFF WBC: CPT

## 2024-05-31 PROCEDURE — 1200000000 HC SEMI PRIVATE

## 2024-05-31 RX ORDER — FLUTICASONE PROPIONATE 50 MCG
2 SPRAY, SUSPENSION (ML) NASAL DAILY
Status: DISCONTINUED | OUTPATIENT
Start: 2024-05-31 | End: 2024-06-04 | Stop reason: HOSPADM

## 2024-05-31 RX ADMIN — TIOTROPIUM BROMIDE INHALATION SPRAY 1 PUFF: 3.12 SPRAY, METERED RESPIRATORY (INHALATION) at 08:30

## 2024-05-31 RX ADMIN — SODIUM CHLORIDE 125 MG: 9 INJECTION, SOLUTION INTRAVENOUS at 13:56

## 2024-05-31 RX ADMIN — CEFEPIME 1000 MG: 1 INJECTION, POWDER, FOR SOLUTION INTRAMUSCULAR; INTRAVENOUS at 21:22

## 2024-05-31 RX ADMIN — TAMSULOSIN HYDROCHLORIDE 0.4 MG: 0.4 CAPSULE ORAL at 09:24

## 2024-05-31 RX ADMIN — CEFEPIME 1000 MG: 1 INJECTION, POWDER, FOR SOLUTION INTRAMUSCULAR; INTRAVENOUS at 09:26

## 2024-05-31 RX ADMIN — PANTOPRAZOLE SODIUM 40 MG: 40 INJECTION, POWDER, FOR SOLUTION INTRAVENOUS at 09:23

## 2024-05-31 NOTE — CARE COORDINATION
Writer updated Michael son on placement, [facility] will not have bed until next week, and [facility] is reviewing.   Michael, son requesting Hospice consult.  Message to Leia @ Hospice, awaiting call back      1440  Rec'd call from Rhiannon at Hospice regarding referral , she will call back with meeting time.     1518  Rec'd call from Ami @ Hospice she will reach out family regarding meeting and advise meeting time.     [facility]  [facility]

## 2024-05-31 NOTE — CONSULTS
Today's Date: 5/29/2024  Patient Name: Shashi Rodriguez  Date of admission: 5/28/2024  7:08 PM  Patient's age: 89 y.o., 2/19/1932  Admission Dx: SOTERO (acute kidney injury) (HCC) [N17.9]  Pancytopenia (HCC) [D61.818]  Anemia, unspecified type [D64.9]  Neutropenia, unspecified type (HCC) [D70.9]    Reason for Consult: management recommendations  Requesting Physician: Keyshawn Dumont DO    CHIEF COMPLAINT: Weakness fatigue abnormal labs    History Obtained From:  patient, electronic medical record    HISTORY OF PRESENT ILLNESS:      The patient is a 89 y.o.  male who is admitted to the hospital with chief complaint of shortness of breath and abnormal lab workup including low hemoglobin.  Lab workup shows pancytopenia.  Patient has past medical history of COPD sleep apnea basal cell carcinoma of the face.  Patient has been not feeling well for a month now.  Complains of extreme weakness weight loss loss of appetite.  Patient denies chest pain fever chills cough nausea vomiting.  Patient CODE STATUS is DNR CCA.    Patient CBC from 2017 was unremarkable however we do not have any CBC for comparison between 2017 and 2024.  Patient CBC from today shows WBC count of 1.0 hemoglobin 7.4 with MCV of 101.  Platelet count 66,000.  Patient has absolute neutrophil count of 0.2.  No blasts reported in the peripheral blood.  Patient's chemistry showed creatinine of 2.1.  Bicarb of 19    Past Medical History:   has a past medical history of Hernia, Kidney stones, and Sleep apnea.    Past Surgical History:   has a past surgical history that includes Tonsillectomy (1950); Vasectomy (1978); Total knee arthroplasty (10/2005); and Cataract removal.     Medications:    Prior to Admission medications    Medication Sig Start Date End Date Taking? Authorizing Provider   losartan-hydroCHLOROthiazide (HYZAAR) 100-12.5 MG per tablet TAKE 1 TABLET BY MOUTH ONCE DAILY FOR 90 DAYS 9/7/22   Provider, MD Zahra   NIFEdipine (ADALAT 
HISTORY  Past Surgical History:   Procedure Laterality Date    CATARACT REMOVAL      Right 11/2006; Left 4/2007    [facility]OMY  [facility]    [facility]    TOTAL KNEE ARTHROPLASTY  10/2005    Bilateral total knee    VASECTOMY  1978    [facility] ([facility])       SOCIAL HISTORY  Social History     Tobacco Use    Smoking status: Former    Smokeless tobacco: Never   Substance Use Topics    Alcohol use: Yes     Comment: occasional       FAMILY HISTORY  No family history on file.    ALLERGIES  Allergies   Allergen Reactions    Asa [Aspirin]     Tomato        MEDICATIONS  Current Medications    ferric gluconate (FERRLECIT) 125 mg in sodium chloride 0.9 % 100 mL IVPB  125 mg IntraVENous Daily    fluticasone  2 spray Each Nostril Daily    pantoprazole (PROTONIX) 40 mg in sodium chloride (PF) 0.9 % 10 mL injection  40 mg IntraVENous Daily    cefepime  1,000 mg IntraVENous Q12H    tamsulosin  0.4 mg Oral Daily    tiotropium  1 puff Inhalation Daily     sodium chloride, Dextromethorphan-guaiFENesin, benzocaine-menthol, midodrine, ondansetron **OR** ondansetron  Home Medications  No current facility-administered medications on file prior to encounter.     Current Outpatient Medications on File Prior to Encounter   Medication Sig Dispense Refill    losartan-hydroCHLOROthiazide (HYZAAR) 100-12.5 MG per tablet TAKE 1 TABLET BY MOUTH ONCE DAILY FOR 90 DAYS      NIFEdipine (ADALAT CC) 90 MG extended release tablet TAKE 1 TABLET BY MOUTH ONCE DAILY ON AN EMPTY STOMACH FOR 90 DAYS      omeprazole (PRILOSEC) 20 MG delayed release capsule TAKE 1 CAPSULE BY MOUTH 30 MINUTES PRIOR TO MORNING MEAL ONCE DAILY FOR 90 DAYS      potassium chloride (KLOR-CON) 10 MEQ extended release tablet TAKE 5 TABLETS BY MOUTH ONCE DAILY WITH FOOD      SPIRIVA RESPIMAT 1.25 MCG/ACT AERS inhaler INHALE 2 PUFFS BY MOUTH ONCE DAILY FOR 90 DAYS      spironolactone (ALDACTONE) 25 MG tablet Take 1 tablet by mouth daily      NIFEdipine (PROCARDIA XL) 90 MG CR

## 2024-05-31 NOTE — ACP (ADVANCE CARE PLANNING)
Advance Care Planning      Palliative Medicine Provider (MD/NP)  Advance Care Planning (ACP) Conversation      Date of Conversation: 05/31/24  The patient and/or authorized decision maker consented to a voluntary Advance Care Planning conversation.   Individuals present for the conversation:   Jamel Rodriguez - [phone]    Legal Healthcare Agent(s):  Michael Rodriguez    ACP documents available in EMR prior to discussion:  -Portable DNR    Primary Palliative Diagnosis(es):  COPD  Neutropenia  CKD    Conversation Summary:  Discussed with patient his current hospitalization. Patient requested inpatient hospice and that he is not interested in invasive testing. He no longer wants blood work completed and interventions. Offered and explained the differences between DNR-CCA and DNR-CC. Patient wishes to wait until after hospice meeting for code status.    Patient states he has healthcare durable power of  and his son Michael is the primary decision maker. Paper work not available to review. Two sons listed as emergency contacts.     Hospice referral made and planning to set up family meeting.     Resuscitation Status:    Code Status: DNR-CCA    Outcomes / Completed Documentation:  An explanation of advance directives and their importance was provided and the following forms completed:    -No new documents completed.    If new document completed, original was provided to patient and/or family member.    Copy was placed for scanning into the [facility] EMR.      I spent 10 minutes providing separately identifiable ACP services with the patient and/or surrogate decision maker in a voluntary, in-person conversation discussing the patient's wishes and goals as detailed in the above note.       Lisa Thompson, APRN - CNP

## 2024-06-01 LAB
ANION GAP SERPL CALCULATED.3IONS-SCNC: 9 MMOL/L (ref 9–17)
ATYPICAL LYMPHOCYTE ABSOLUTE COUNT: 0.04 K/UL
ATYPICAL LYMPHOCYTES: 3 %
BASOPHILS # BLD: 0 K/UL (ref 0–0.2)
BASOPHILS NFR BLD: 0 % (ref 0–2)
BUN SERPL-MCNC: 25 MG/DL (ref 8–23)
CALCIUM SERPL-MCNC: 9 MG/DL (ref 8.6–10.4)
CHLORIDE SERPL-SCNC: 106 MMOL/L (ref 98–107)
CO2 SERPL-SCNC: 23 MMOL/L (ref 20–31)
CREAT SERPL-MCNC: 1.2 MG/DL (ref 0.7–1.2)
EOSINOPHIL # BLD: 0.05 K/UL (ref 0–0.4)
EOSINOPHILS RELATIVE PERCENT: 4 % (ref 1–4)
ERYTHROCYTE [DISTWIDTH] IN BLOOD BY AUTOMATED COUNT: 15.4 % (ref 12.5–15.4)
GFR, ESTIMATED: 57 ML/MIN/1.73M2
GLUCOSE SERPL-MCNC: 107 MG/DL (ref 70–99)
HCT VFR BLD AUTO: 21.9 % (ref 41–53)
HCT VFR BLD AUTO: 22 % (ref 41–53)
HGB BLD-MCNC: 7.4 G/DL (ref 13.5–17.5)
HGB BLD-MCNC: 7.6 G/DL (ref 13.5–17.5)
LYMPHOCYTES NFR BLD: 0.77 K/UL (ref 1–4.8)
LYMPHOCYTES RELATIVE PERCENT: 65 % (ref 24–44)
MAGNESIUM SERPL-MCNC: 1.9 MG/DL (ref 1.6–2.6)
MCH RBC QN AUTO: 33.9 PG (ref 26–34)
MCHC RBC AUTO-ENTMCNC: 33.6 G/DL (ref 31–37)
MCV RBC AUTO: 100.8 FL (ref 80–100)
MONOCYTES NFR BLD: 0.1 K/UL (ref 0.1–0.8)
MONOCYTES NFR BLD: 8 % (ref 1–7)
MORPHOLOGY: ABNORMAL
MORPHOLOGY: ABNORMAL
NEUTROPHILS NFR BLD: 20 % (ref 36–66)
NEUTS SEG NFR BLD: 0.24 K/UL (ref 1.8–7.7)
PLATELET # BLD AUTO: 142 K/UL (ref 140–450)
PMV BLD AUTO: 7.8 FL (ref 6–12)
POTASSIUM SERPL-SCNC: 4.3 MMOL/L (ref 3.7–5.3)
RBC # BLD AUTO: 2.19 M/UL (ref 4.5–5.9)
SODIUM SERPL-SCNC: 138 MMOL/L (ref 135–144)
WBC OTHER # BLD: 1.2 K/UL (ref 3.5–11)

## 2024-06-01 PROCEDURE — 99232 SBSQ HOSP IP/OBS MODERATE 35: CPT | Performed by: STUDENT IN AN ORGANIZED HEALTH CARE EDUCATION/TRAINING PROGRAM

## 2024-06-01 PROCEDURE — 80048 BASIC METABOLIC PNL TOTAL CA: CPT

## 2024-06-01 PROCEDURE — 6360000002 HC RX W HCPCS: Performed by: STUDENT IN AN ORGANIZED HEALTH CARE EDUCATION/TRAINING PROGRAM

## 2024-06-01 PROCEDURE — 85025 COMPLETE CBC W/AUTO DIFF WBC: CPT

## 2024-06-01 PROCEDURE — 1200000000 HC SEMI PRIVATE

## 2024-06-01 PROCEDURE — 94760 N-INVAS EAR/PLS OXIMETRY 1: CPT

## 2024-06-01 PROCEDURE — C9113 INJ PANTOPRAZOLE SODIUM, VIA: HCPCS | Performed by: STUDENT IN AN ORGANIZED HEALTH CARE EDUCATION/TRAINING PROGRAM

## 2024-06-01 PROCEDURE — 94640 AIRWAY INHALATION TREATMENT: CPT

## 2024-06-01 PROCEDURE — 2580000003 HC RX 258: Performed by: STUDENT IN AN ORGANIZED HEALTH CARE EDUCATION/TRAINING PROGRAM

## 2024-06-01 PROCEDURE — 36415 COLL VENOUS BLD VENIPUNCTURE: CPT

## 2024-06-01 PROCEDURE — 6360000002 HC RX W HCPCS: Performed by: INTERNAL MEDICINE

## 2024-06-01 PROCEDURE — 6370000000 HC RX 637 (ALT 250 FOR IP)

## 2024-06-01 PROCEDURE — 2580000003 HC RX 258: Performed by: INTERNAL MEDICINE

## 2024-06-01 PROCEDURE — A4216 STERILE WATER/SALINE, 10 ML: HCPCS | Performed by: STUDENT IN AN ORGANIZED HEALTH CARE EDUCATION/TRAINING PROGRAM

## 2024-06-01 PROCEDURE — 85018 HEMOGLOBIN: CPT

## 2024-06-01 PROCEDURE — 85014 HEMATOCRIT: CPT

## 2024-06-01 PROCEDURE — 83735 ASSAY OF MAGNESIUM: CPT

## 2024-06-01 RX ADMIN — TIOTROPIUM BROMIDE INHALATION SPRAY 1 PUFF: 3.12 SPRAY, METERED RESPIRATORY (INHALATION) at 08:54

## 2024-06-01 RX ADMIN — SODIUM CHLORIDE 125 MG: 9 INJECTION, SOLUTION INTRAVENOUS at 15:13

## 2024-06-01 RX ADMIN — CEFEPIME 1000 MG: 1 INJECTION, POWDER, FOR SOLUTION INTRAMUSCULAR; INTRAVENOUS at 08:51

## 2024-06-01 RX ADMIN — CEFEPIME 1000 MG: 1 INJECTION, POWDER, FOR SOLUTION INTRAMUSCULAR; INTRAVENOUS at 21:11

## 2024-06-01 RX ADMIN — PANTOPRAZOLE SODIUM 40 MG: 40 INJECTION, POWDER, FOR SOLUTION INTRAVENOUS at 08:44

## 2024-06-01 RX ADMIN — SALINE NASAL SPRAY 1 SPRAY: 1.5 SOLUTION NASAL at 08:46

## 2024-06-01 RX ADMIN — TAMSULOSIN HYDROCHLORIDE 0.4 MG: 0.4 CAPSULE ORAL at 08:44

## 2024-06-02 LAB
BASOPHILS # BLD: 0 K/UL (ref 0–0.2)
BASOPHILS NFR BLD: 0 % (ref 0–2)
EOSINOPHIL # BLD: 0.03 K/UL (ref 0–0.4)
EOSINOPHILS RELATIVE PERCENT: 3 % (ref 1–4)
ERYTHROCYTE [DISTWIDTH] IN BLOOD BY AUTOMATED COUNT: 15.3 % (ref 12.5–15.4)
HCT VFR BLD AUTO: 21.2 % (ref 41–53)
HGB BLD-MCNC: 7.1 G/DL (ref 13.5–17.5)
LYMPHOCYTES NFR BLD: 0.75 K/UL (ref 1–4.8)
LYMPHOCYTES RELATIVE PERCENT: 75 % (ref 24–44)
MCH RBC QN AUTO: 33.7 PG (ref 26–34)
MCHC RBC AUTO-ENTMCNC: 33.4 G/DL (ref 31–37)
MCV RBC AUTO: 100.8 FL (ref 80–100)
METAMYELOCYTES ABSOLUTE COUNT: 0.03 K/UL
METAMYELOCYTES: 3 %
MONOCYTES NFR BLD: 0.05 K/UL (ref 0.1–0.8)
MONOCYTES NFR BLD: 5 % (ref 1–7)
MORPHOLOGY: ABNORMAL
NEUTROPHILS NFR BLD: 14 % (ref 36–66)
NEUTS SEG NFR BLD: 0.14 K/UL (ref 1.8–7.7)
PLATELET # BLD AUTO: 144 K/UL (ref 140–450)
PMV BLD AUTO: 8 FL (ref 6–12)
RBC # BLD AUTO: 2.11 M/UL (ref 4.5–5.9)
WBC OTHER # BLD: 1 K/UL (ref 3.5–11)

## 2024-06-02 PROCEDURE — A4216 STERILE WATER/SALINE, 10 ML: HCPCS | Performed by: STUDENT IN AN ORGANIZED HEALTH CARE EDUCATION/TRAINING PROGRAM

## 2024-06-02 PROCEDURE — 6370000000 HC RX 637 (ALT 250 FOR IP)

## 2024-06-02 PROCEDURE — 6370000000 HC RX 637 (ALT 250 FOR IP): Performed by: NURSE PRACTITIONER

## 2024-06-02 PROCEDURE — 85025 COMPLETE CBC W/AUTO DIFF WBC: CPT

## 2024-06-02 PROCEDURE — 36415 COLL VENOUS BLD VENIPUNCTURE: CPT

## 2024-06-02 PROCEDURE — 6360000002 HC RX W HCPCS: Performed by: STUDENT IN AN ORGANIZED HEALTH CARE EDUCATION/TRAINING PROGRAM

## 2024-06-02 PROCEDURE — 94667 MNPJ CHEST WALL 1ST: CPT

## 2024-06-02 PROCEDURE — 2580000003 HC RX 258: Performed by: STUDENT IN AN ORGANIZED HEALTH CARE EDUCATION/TRAINING PROGRAM

## 2024-06-02 PROCEDURE — 1200000000 HC SEMI PRIVATE

## 2024-06-02 PROCEDURE — C9113 INJ PANTOPRAZOLE SODIUM, VIA: HCPCS | Performed by: STUDENT IN AN ORGANIZED HEALTH CARE EDUCATION/TRAINING PROGRAM

## 2024-06-02 PROCEDURE — 99232 SBSQ HOSP IP/OBS MODERATE 35: CPT | Performed by: STUDENT IN AN ORGANIZED HEALTH CARE EDUCATION/TRAINING PROGRAM

## 2024-06-02 RX ORDER — TRAMADOL HYDROCHLORIDE 50 MG/1
50 TABLET ORAL ONCE
Status: COMPLETED | OUTPATIENT
Start: 2024-06-02 | End: 2024-06-03

## 2024-06-02 RX ORDER — LANOLIN ALCOHOL/MO/W.PET/CERES
3 CREAM (GRAM) TOPICAL NIGHTLY PRN
Status: DISCONTINUED | OUTPATIENT
Start: 2024-06-02 | End: 2024-06-04 | Stop reason: HOSPADM

## 2024-06-02 RX ADMIN — CEFEPIME 1000 MG: 1 INJECTION, POWDER, FOR SOLUTION INTRAMUSCULAR; INTRAVENOUS at 20:51

## 2024-06-02 RX ADMIN — PANTOPRAZOLE SODIUM 40 MG: 40 INJECTION, POWDER, FOR SOLUTION INTRAVENOUS at 10:02

## 2024-06-02 RX ADMIN — Medication 3 MG: at 21:54

## 2024-06-02 RX ADMIN — Medication 3 MG: at 03:31

## 2024-06-02 RX ADMIN — CEFEPIME 1000 MG: 1 INJECTION, POWDER, FOR SOLUTION INTRAMUSCULAR; INTRAVENOUS at 09:56

## 2024-06-03 PROBLEM — D64.9 ANEMIA: Status: ACTIVE | Noted: 2024-06-03

## 2024-06-03 LAB
ATYPICAL LYMPHOCYTE ABSOLUTE COUNT: 0.04 K/UL
ATYPICAL LYMPHOCYTES: 3 %
BASOPHILS # BLD: 0 K/UL (ref 0–0.2)
BASOPHILS NFR BLD: 0 % (ref 0–2)
EOSINOPHIL # BLD: 0.01 K/UL (ref 0–0.4)
EOSINOPHILS RELATIVE PERCENT: 1 % (ref 1–4)
ERYTHROCYTE [DISTWIDTH] IN BLOOD BY AUTOMATED COUNT: 15 % (ref 12.5–15.4)
HCT VFR BLD AUTO: 20.3 % (ref 41–53)
HGB BLD-MCNC: 6.9 G/DL (ref 13.5–17.5)
LYMPHOCYTES NFR BLD: 1.07 K/UL (ref 1–4.8)
LYMPHOCYTES RELATIVE PERCENT: 76 % (ref 24–44)
MCH RBC QN AUTO: 33.8 PG (ref 26–34)
MCHC RBC AUTO-ENTMCNC: 33.9 G/DL (ref 31–37)
MCV RBC AUTO: 99.8 FL (ref 80–100)
MICROORGANISM SPEC CULT: NORMAL
MICROORGANISM SPEC CULT: NORMAL
MONOCYTES NFR BLD: 0.07 K/UL (ref 0.1–0.8)
MONOCYTES NFR BLD: 5 % (ref 1–7)
MORPHOLOGY: ABNORMAL
NEUTROPHILS NFR BLD: 15 % (ref 36–66)
NEUTS SEG NFR BLD: 0.21 K/UL (ref 1.8–7.7)
PLATELET # BLD AUTO: 146 K/UL (ref 140–450)
PMV BLD AUTO: 8 FL (ref 6–12)
RBC # BLD AUTO: 2.03 M/UL (ref 4.5–5.9)
SERVICE CMNT-IMP: NORMAL
SERVICE CMNT-IMP: NORMAL
SPECIMEN DESCRIPTION: NORMAL
SPECIMEN DESCRIPTION: NORMAL
WBC OTHER # BLD: 1.4 K/UL (ref 3.5–11)

## 2024-06-03 PROCEDURE — 6370000000 HC RX 637 (ALT 250 FOR IP)

## 2024-06-03 PROCEDURE — 99232 SBSQ HOSP IP/OBS MODERATE 35: CPT | Performed by: STUDENT IN AN ORGANIZED HEALTH CARE EDUCATION/TRAINING PROGRAM

## 2024-06-03 PROCEDURE — 94640 AIRWAY INHALATION TREATMENT: CPT

## 2024-06-03 PROCEDURE — 36415 COLL VENOUS BLD VENIPUNCTURE: CPT

## 2024-06-03 PROCEDURE — 2580000003 HC RX 258: Performed by: STUDENT IN AN ORGANIZED HEALTH CARE EDUCATION/TRAINING PROGRAM

## 2024-06-03 PROCEDURE — 6370000000 HC RX 637 (ALT 250 FOR IP): Performed by: NURSE PRACTITIONER

## 2024-06-03 PROCEDURE — 99232 SBSQ HOSP IP/OBS MODERATE 35: CPT | Performed by: INTERNAL MEDICINE

## 2024-06-03 PROCEDURE — 6360000002 HC RX W HCPCS: Performed by: STUDENT IN AN ORGANIZED HEALTH CARE EDUCATION/TRAINING PROGRAM

## 2024-06-03 PROCEDURE — A4216 STERILE WATER/SALINE, 10 ML: HCPCS | Performed by: STUDENT IN AN ORGANIZED HEALTH CARE EDUCATION/TRAINING PROGRAM

## 2024-06-03 PROCEDURE — 94760 N-INVAS EAR/PLS OXIMETRY 1: CPT

## 2024-06-03 PROCEDURE — 85025 COMPLETE CBC W/AUTO DIFF WBC: CPT

## 2024-06-03 PROCEDURE — 1200000000 HC SEMI PRIVATE

## 2024-06-03 PROCEDURE — C9113 INJ PANTOPRAZOLE SODIUM, VIA: HCPCS | Performed by: STUDENT IN AN ORGANIZED HEALTH CARE EDUCATION/TRAINING PROGRAM

## 2024-06-03 RX ORDER — LEVOFLOXACIN 500 MG/1
500 TABLET, FILM COATED ORAL DAILY
Qty: 5 TABLET | Refills: 0 | Status: SHIPPED | OUTPATIENT
Start: 2024-06-03 | End: 2024-06-08

## 2024-06-03 RX ORDER — FUROSEMIDE 40 MG/1
40 TABLET ORAL 2 TIMES DAILY
Qty: 60 TABLET | Refills: 3 | Status: SHIPPED | OUTPATIENT
Start: 2024-06-03

## 2024-06-03 RX ORDER — LEVOFLOXACIN 750 MG/1
750 TABLET, FILM COATED ORAL
Status: DISCONTINUED | OUTPATIENT
Start: 2024-06-03 | End: 2024-06-04 | Stop reason: HOSPADM

## 2024-06-03 RX ORDER — SENNA AND DOCUSATE SODIUM 50; 8.6 MG/1; MG/1
2 TABLET, FILM COATED ORAL DAILY
Status: DISCONTINUED | OUTPATIENT
Start: 2024-06-03 | End: 2024-06-04 | Stop reason: HOSPADM

## 2024-06-03 RX ORDER — NIFEDIPINE 90 MG/1
90 TABLET, EXTENDED RELEASE ORAL DAILY
Qty: 30 TABLET | Refills: 3 | Status: SHIPPED | OUTPATIENT
Start: 2024-06-03

## 2024-06-03 RX ORDER — LOSARTAN POTASSIUM 50 MG/1
50 TABLET ORAL DAILY
Qty: 30 TABLET | Refills: 3 | Status: SHIPPED | OUTPATIENT
Start: 2024-06-03

## 2024-06-03 RX ORDER — MIDODRINE HYDROCHLORIDE 5 MG/1
5 TABLET ORAL 3 TIMES DAILY PRN
Qty: 90 TABLET | Refills: 3 | Status: SHIPPED | OUTPATIENT
Start: 2024-06-03

## 2024-06-03 RX ADMIN — SENNOSIDES AND DOCUSATE SODIUM 2 TABLET: 50; 8.6 TABLET ORAL at 20:38

## 2024-06-03 RX ADMIN — TIOTROPIUM BROMIDE INHALATION SPRAY 1 PUFF: 3.12 SPRAY, METERED RESPIRATORY (INHALATION) at 09:00

## 2024-06-03 RX ADMIN — LEVOFLOXACIN 750 MG: 750 TABLET, FILM COATED ORAL at 20:38

## 2024-06-03 RX ADMIN — TAMSULOSIN HYDROCHLORIDE 0.4 MG: 0.4 CAPSULE ORAL at 08:48

## 2024-06-03 RX ADMIN — CEFEPIME 1000 MG: 1 INJECTION, POWDER, FOR SOLUTION INTRAMUSCULAR; INTRAVENOUS at 08:51

## 2024-06-03 RX ADMIN — Medication 3 MG: at 20:38

## 2024-06-03 RX ADMIN — PANTOPRAZOLE SODIUM 40 MG: 40 INJECTION, POWDER, FOR SOLUTION INTRAVENOUS at 09:09

## 2024-06-03 RX ADMIN — TRAMADOL HYDROCHLORIDE 50 MG: 50 TABLET ORAL at 08:46

## 2024-06-03 ASSESSMENT — PAIN DESCRIPTION - DESCRIPTORS: DESCRIPTORS: ACHING;CRAMPING;DISCOMFORT

## 2024-06-03 ASSESSMENT — PAIN SCALES - GENERAL: PAINLEVEL_OUTOF10: 8

## 2024-06-03 ASSESSMENT — PAIN DESCRIPTION - LOCATION: LOCATION: ANKLE

## 2024-06-03 ASSESSMENT — PAIN DESCRIPTION - ORIENTATION: ORIENTATION: POSTERIOR;ANTERIOR

## 2024-06-03 NOTE — DISCHARGE INSTRUCTIONS
Follow-up outpatient with PCP and hospice.  Continue medications as prescribed.  Continue diet as recommended.  Blood pressure medications were discontinued as patient's blood pressure was soft, discharged on midodrine 5 Mg 2 times daily.

## 2024-06-03 NOTE — CARE COORDINATION
Writer spoke with Sulema @ [facility], regarding family request for assisted living and long term care. She will meet with family this afternoon, family notified.     1520 Patient accepted at [facility] can transport @11am 6/4    1735 Sulema @ [facility] notified patient on soft/bite size, no drinking straw.  No barium swallow to be done per Marah LPN.

## 2024-06-03 NOTE — DISCHARGE INSTR - COC
Isolation            Neutropenic          Patient Infection Status       None to display            Nurse Assessment:  Last Vital Signs: BP (!) 113/43   Pulse 73   Temp 98.6 °F (37 °C) (Axillary)   Resp 17   Ht 1.702 m (5' 7\")   Wt 83.9 kg (185 lb)   SpO2 93%   BMI 28.98 kg/m²     Last documented pain score (0-10 scale): Pain Level: 8  Last Weight:   Wt Readings from Last 1 Encounters:   05/30/24 83.9 kg (185 lb)     Mental Status:  oriented and alert    IV Access:  - None    Nursing Mobility/ADLs:  Walking   Assisted  Transfer  Assisted  Bathing  Assisted  Dressing  Assisted  Toileting  Assisted  Feeding  Assisted  Med Admin  Assisted  Med Delivery   whole    Wound Care Documentation and Therapy:        Elimination:  Continence:   Bowel: Yes  Bladder: Indwelling Catheter  Urinary Catheter: Insertion Date: 5/29/2024    Colostomy/Ileostomy/Ileal Conduit: No       Date of Last BM: 6/3/2024    Intake/Output Summary (Last 24 hours) at 6/3/2024 1307  Last data filed at 6/3/2024 1153  Gross per 24 hour   Intake 120 ml   Output 1300 ml   Net -1180 ml     I/O last 3 completed shifts:  In: -   Out: 1975 [Urine:1975]    Safety Concerns:     None    Impairments/Disabilities:      Vision, Hearing, and Weakness    Nutrition Therapy:  Current Nutrition Therapy:   - Oral Diet:  Dysphagia - Soft and Bite Sized    Routes of Feeding: Oral  Liquids: Thin liquids, NO straws  Daily Fluid Restriction: no  Last Modified Barium Swallow with Video (Video Swallowing Test): not done    Treatments at the Time of Hospital Discharge:   Respiratory Treatments: Spiriva Inhaler 1 puff daily  Oxygen Therapy:   None  Ventilator:    - CPAP   only when sleeping    Rehab Therapies: Speech/Language Therapy  Weight Bearing Status/Restrictions: No weight bearing restrictions  Other Medical Equipment (for information only, NOT a DME order):  walker  Other Treatments: None    Patient's personal belongings (please select all that are sent with

## 2024-06-03 NOTE — DISCHARGE INSTR - DIET

## 2024-06-04 VITALS
HEART RATE: 66 BPM | HEIGHT: 67 IN | RESPIRATION RATE: 16 BRPM | OXYGEN SATURATION: 92 % | WEIGHT: 185 LBS | TEMPERATURE: 98.1 F | DIASTOLIC BLOOD PRESSURE: 49 MMHG | SYSTOLIC BLOOD PRESSURE: 104 MMHG | BODY MASS INDEX: 29.03 KG/M2

## 2024-06-04 PROCEDURE — 6370000000 HC RX 637 (ALT 250 FOR IP): Performed by: NURSE PRACTITIONER

## 2024-06-04 PROCEDURE — 6370000000 HC RX 637 (ALT 250 FOR IP)

## 2024-06-04 PROCEDURE — 99232 SBSQ HOSP IP/OBS MODERATE 35: CPT | Performed by: INTERNAL MEDICINE

## 2024-06-04 PROCEDURE — 99232 SBSQ HOSP IP/OBS MODERATE 35: CPT | Performed by: STUDENT IN AN ORGANIZED HEALTH CARE EDUCATION/TRAINING PROGRAM

## 2024-06-04 RX ADMIN — SENNOSIDES AND DOCUSATE SODIUM 2 TABLET: 50; 8.6 TABLET ORAL at 10:23

## 2024-06-04 RX ADMIN — TAMSULOSIN HYDROCHLORIDE 0.4 MG: 0.4 CAPSULE ORAL at 10:21

## 2024-06-04 RX ADMIN — SALINE NASAL SPRAY 1 SPRAY: 1.5 SOLUTION NASAL at 10:22

## 2024-06-04 RX ADMIN — SALINE NASAL SPRAY 1 SPRAY: 1.5 SOLUTION NASAL at 10:23

## 2024-06-04 NOTE — PLAN OF CARE
Problem: Discharge Planning  Goal: Discharge to home or other facility with appropriate resources  5/31/2024 0312 by Angie Cuello, RN  Outcome: Progressing  Flowsheets (Taken 5/30/2024 2000)  Discharge to home or other facility with appropriate resources:   Identify barriers to discharge with patient and caregiver   Arrange for needed discharge resources and transportation as appropriate   Identify discharge learning needs (meds, wound care, etc)   Arrange for interpreters to assist at discharge as needed   Refer to discharge planning if patient needs post-hospital services based on physician order or complex needs related to functional status, cognitive ability or social support system     Problem: Safety - Adult  Goal: Free from fall injury  5/31/2024 0312 by Angie Cuello RN  Outcome: Progressing     Problem: ABCDS Injury Assessment  Goal: Absence of physical injury  5/31/2024 0312 by Angie Cuello RN  Outcome: Progressing     Problem: Neurosensory - Adult  Goal: Achieves stable or improved neurological status  5/31/2024 0312 by Angie Cuello RN  Outcome: Progressing  Flowsheets (Taken 5/30/2024 2000)  Achieves stable or improved neurological status:   Assess for and report changes in neurological status   Initiate measures to prevent increased intracranial pressure   Maintain blood pressure and fluid volume within ordered parameters to optimize cerebral perfusion and minimize risk of hemorrhage     Problem: Respiratory - Adult  Goal: Achieves optimal ventilation and oxygenation  5/31/2024 0312 by Angie Cuello, RN  Outcome: Progressing  Flowsheets (Taken 5/30/2024 2000)  Achieves optimal ventilation and oxygenation:   Assess for changes in respiratory status   Assess for changes in mentation and behavior   Position to facilitate oxygenation and minimize respiratory effort   Assess and instruct to report shortness of breath or any respiratory difficulty   Respiratory therapy support as indicated   
  Problem: Discharge Planning  Goal: Discharge to home or other facility with appropriate resources  6/1/2024 0032 by Rebeca Callaway RN  Outcome: Progressing  Flowsheets (Taken 5/31/2024 2000)  Discharge to home or other facility with appropriate resources: Identify barriers to discharge with patient and caregiver     Problem: Safety - Adult  Goal: Free from fall injury  6/1/2024 0032 by Rebeca Callwaay RN  Outcome: Progressing   NO FALLS OR INJURIES THIS SHIFT, BED IN LOWEST POSITION, BRAKES ON, BED ALARM ON, CALL LIGHT IN REACH, SIDE RAILS UP X2.    Problem: ABCDS Injury Assessment  Goal: Absence of physical injury  6/1/2024 0032 by Rebeca Callaway RN  Outcome: Progressing     Problem: Neurosensory - Adult  Goal: Achieves stable or improved neurological status  6/1/2024 0032 by Rebeca Callaway RN  Outcome: Progressing  Flowsheets (Taken 5/31/2024 2000)  Achieves stable or improved neurological status: Assess for and report changes in neurological status     Problem: Respiratory - Adult  Goal: Achieves optimal ventilation and oxygenation  6/1/2024 0032 by Rebeca Callaway RN  Outcome: Progressing  Flowsheets (Taken 5/31/2024 2000)  Achieves optimal ventilation and oxygenation: Assess for changes in respiratory status     Problem: Cardiovascular - Adult  Goal: Maintains optimal cardiac output and hemodynamic stability  6/1/2024 0032 by Rebeca Callaway RN  Outcome: Progressing  Flowsheets (Taken 5/31/2024 2000)  Maintains optimal cardiac output and hemodynamic stability: Monitor blood pressure and heart rate     Problem: Skin/Tissue Integrity - Adult  Goal: Skin integrity remains intact  6/1/2024 0032 by Rebeca Callaway RN  Outcome: Progressing  Flowsheets (Taken 5/31/2024 2000)  Skin Integrity Remains Intact: Monitor for areas of redness and/or skin breakdown     Problem: Musculoskeletal - Adult  Goal: Return mobility to safest level of function  6/1/2024 0032 by 
  Problem: Discharge Planning  Goal: Discharge to home or other facility with appropriate resources  6/3/2024 2057 by Rishi Jiang RN  Outcome: Progressing  Flowsheets (Taken 6/3/2024 2000)  Discharge to home or other facility with appropriate resources: Identify barriers to discharge with patient and caregiver  6/3/2024 1410 by Marah Roman LPN  Outcome: Adequate for Discharge     Problem: Safety - Adult  Goal: Free from fall injury  6/3/2024 2057 by Rishi Jiang RN  Outcome: Progressing  Flowsheets (Taken 6/3/2024 2005)  Free From Fall Injury: Instruct family/caregiver on patient safety  6/3/2024 1410 by Marah Roman LPN  Outcome: Adequate for Discharge     Problem: Neurosensory - Adult  Goal: Achieves stable or improved neurological status  Outcome: Progressing  Flowsheets (Taken 6/3/2024 2057)  Achieves stable or improved neurological status: Assess for and report changes in neurological status     Problem: Respiratory - Adult  Goal: Achieves optimal ventilation and oxygenation  Outcome: Progressing  Flowsheets (Taken 6/2/2024 2235)  Achieves optimal ventilation and oxygenation:   Assess for changes in respiratory status   Assess for changes in mentation and behavior   Position to facilitate oxygenation and minimize respiratory effort   Oxygen supplementation based on oxygen saturation or arterial blood gases     Problem: Skin/Tissue Integrity - Adult  Goal: Skin integrity remains intact  6/3/2024 2057 by Rishi Jiang RN  Outcome: Progressing  Flowsheets  Taken 6/3/2024 2057  Skin Integrity Remains Intact: Monitor for areas of redness and/or skin breakdown  Taken 6/3/2024 2005  Skin Integrity Remains Intact: Monitor for areas of redness and/or skin breakdown  Taken 6/3/2024 2000  Skin Integrity Remains Intact: Monitor for areas of redness and/or skin breakdown  6/3/2024 1410 by Marah Roman LPN  Outcome: Adequate for Discharge     Problem: Musculoskeletal - Adult  Goal: Return 
  Problem: Discharge Planning  Goal: Discharge to home or other facility with appropriate resources  Outcome: Adequate for Discharge     Problem: Safety - Adult  Goal: Free from fall injury  Outcome: Adequate for Discharge  Flowsheets (Taken 6/4/2024 1015)  Free From Fall Injury: Instruct family/caregiver on patient safety     Problem: ABCDS Injury Assessment  Goal: Absence of physical injury  Outcome: Adequate for Discharge  Flowsheets (Taken 6/4/2024 1015)  Absence of Physical Injury: Implement safety measures based on patient assessment     Problem: Neurosensory - Adult  Goal: Achieves stable or improved neurological status  Outcome: Adequate for Discharge     Problem: Respiratory - Adult  Goal: Achieves optimal ventilation and oxygenation  Outcome: Adequate for Discharge     Problem: Cardiovascular - Adult  Goal: Maintains optimal cardiac output and hemodynamic stability  Outcome: Adequate for Discharge     Problem: Skin/Tissue Integrity - Adult  Goal: Skin integrity remains intact  Outcome: Adequate for Discharge  Flowsheets (Taken 6/4/2024 1015)  Skin Integrity Remains Intact: Monitor for areas of redness and/or skin breakdown     Problem: Musculoskeletal - Adult  Goal: Return mobility to safest level of function  Outcome: Adequate for Discharge     Problem: Gastrointestinal - Adult  Goal: Minimal or absence of nausea and vomiting  Outcome: Adequate for Discharge     Problem: Genitourinary - Adult  Goal: Absence of urinary retention  Outcome: Adequate for Discharge     Problem: Infection - Adult  Goal: Absence of infection at discharge  Outcome: Adequate for Discharge     Problem: Metabolic/Fluid and Electrolytes - Adult  Goal: Electrolytes maintained within normal limits  Outcome: Adequate for Discharge     Problem: Hematologic - Adult  Goal: Maintains hematologic stability  Outcome: Adequate for Discharge     Problem: Skin/Tissue Integrity  Goal: Absence of new skin breakdown  Description: 1.  Monitor for 
  Problem: Discharge Planning  Goal: Discharge to home or other facility with appropriate resources  Outcome: Progressing     Problem: Safety - Adult  Goal: Free from fall injury  Outcome: Progressing     Problem: ABCDS Injury Assessment  Goal: Absence of physical injury  Outcome: Progressing     Problem: Neurosensory - Adult  Goal: Achieves stable or improved neurological status  Outcome: Progressing     Problem: Respiratory - Adult  Goal: Achieves optimal ventilation and oxygenation  Outcome: Progressing     Problem: Cardiovascular - Adult  Goal: Maintains optimal cardiac output and hemodynamic stability  Outcome: Progressing     Problem: Skin/Tissue Integrity - Adult  Goal: Skin integrity remains intact  Outcome: Progressing  Flowsheets (Taken 5/30/2024 0800)  Skin Integrity Remains Intact:   Monitor for areas of redness and/or skin breakdown   Assess vascular access sites hourly     Problem: Musculoskeletal - Adult  Goal: Return mobility to safest level of function  Outcome: Progressing  Flowsheets (Taken 5/30/2024 0800)  Return Mobility to Safest Level of Function:   Assess patient stability and activity tolerance for standing, transferring and ambulating with or without assistive devices   Assist with transfers and ambulation using safe patient handling equipment as needed   Instruct patient/family in ordered activity level     Problem: Gastrointestinal - Adult  Goal: Minimal or absence of nausea and vomiting  Outcome: Progressing     Problem: Genitourinary - Adult  Goal: Absence of urinary retention  Outcome: Progressing     Problem: Infection - Adult  Goal: Absence of infection at discharge  Outcome: Progressing     Problem: Metabolic/Fluid and Electrolytes - Adult  Goal: Electrolytes maintained within normal limits  Outcome: Progressing     Problem: Hematologic - Adult  Goal: Maintains hematologic stability  Outcome: Progressing     Problem: Skin/Tissue Integrity  Goal: Absence of new skin 
  Problem: Discharge Planning  Goal: Discharge to home or other facility with appropriate resources  Outcome: Progressing     Problem: Safety - Adult  Goal: Free from fall injury  Outcome: Progressing    No falls/injuries this shift, bed in lowest position, brakes on, bed alarm on, call light within reach, side rails up x2.      Problem: ABCDS Injury Assessment  Goal: Absence of physical injury  Outcome: Progressing     Problem: Neurosensory - Adult  Goal: Achieves stable or improved neurological status  Outcome: Progressing     Problem: Respiratory - Adult  Goal: Achieves optimal ventilation and oxygenation  Outcome: Progressing  Flowsheets (Taken 5/31/2024 0730 by Ani Reed, ILDA)  Achieves optimal ventilation and oxygenation:   Assess for changes in respiratory status   Position to facilitate oxygenation and minimize respiratory effort   Oxygen supplementation based on oxygen saturation or arterial blood gases   Assess and instruct to report shortness of breath or any respiratory difficulty   Respiratory therapy support as indicated     Problem: Cardiovascular - Adult  Goal: Maintains optimal cardiac output and hemodynamic stability  Outcome: Progressing     Problem: Skin/Tissue Integrity - Adult  Goal: Skin integrity remains intact  Outcome: Progressing     Problem: Musculoskeletal - Adult  Goal: Return mobility to safest level of function  Outcome: Progressing     Problem: Gastrointestinal - Adult  Goal: Minimal or absence of nausea and vomiting  Outcome: Progressing     Problem: Genitourinary - Adult  Goal: Absence of urinary retention  Outcome: Progressing     Problem: Infection - Adult  Goal: Absence of infection at discharge  Outcome: Progressing     Problem: Metabolic/Fluid and Electrolytes - Adult  Goal: Electrolytes maintained within normal limits  Outcome: Progressing     Problem: Hematologic - Adult  Goal: Maintains hematologic stability  Outcome: Progressing     Problem: Skin/Tissue 
  Problem: Discharge Planning  Goal: Discharge to home or other facility with appropriate resources  Outcome: Progressing  Flowsheets (Taken 5/29/2024 0039)  Discharge to home or other facility with appropriate resources:   Identify barriers to discharge with patient and caregiver   Arrange for needed discharge resources and transportation as appropriate   Identify discharge learning needs (meds, wound care, etc)   Refer to discharge planning if patient needs post-hospital services based on physician order or complex needs related to functional status, cognitive ability or social support system     Problem: Safety - Adult  Goal: Free from fall injury  Outcome: Progressing  Flowsheets (Taken 5/29/2024 0039)  Free From Fall Injury: Instruct family/caregiver on patient safety     Problem: ABCDS Injury Assessment  Goal: Absence of physical injury  Outcome: Progressing  Flowsheets (Taken 5/29/2024 0039)  Absence of Physical Injury: Implement safety measures based on patient assessment     
  Problem: Discharge Planning  Goal: Discharge to home or other facility with appropriate resources  Outcome: Progressing  Flowsheets (Taken 5/29/2024 0807)  Discharge to home or other facility with appropriate resources: Identify barriers to discharge with patient and caregiver     Problem: Safety - Adult  Goal: Free from fall injury  Outcome: Progressing     Problem: ABCDS Injury Assessment  Goal: Absence of physical injury  Outcome: Progressing     Problem: Neurosensory - Adult  Goal: Achieves stable or improved neurological status  Outcome: Progressing  Flowsheets (Taken 5/29/2024 0807)  Achieves stable or improved neurological status: Assess for and report changes in neurological status     Problem: Respiratory - Adult  Goal: Achieves optimal ventilation and oxygenation  Outcome: Progressing  Flowsheets (Taken 5/29/2024 0807)  Achieves optimal ventilation and oxygenation: Assess for changes in respiratory status     Problem: Cardiovascular - Adult  Goal: Maintains optimal cardiac output and hemodynamic stability  Outcome: Progressing  Flowsheets (Taken 5/29/2024 0807)  Maintains optimal cardiac output and hemodynamic stability: Monitor blood pressure and heart rate     Problem: Skin/Tissue Integrity - Adult  Goal: Skin integrity remains intact  Outcome: Progressing     Problem: Musculoskeletal - Adult  Goal: Return mobility to safest level of function  Outcome: Progressing  Flowsheets (Taken 5/29/2024 0807)  Return Mobility to Safest Level of Function: Assess patient stability and activity tolerance for standing, transferring and ambulating with or without assistive devices     Problem: Gastrointestinal - Adult  Goal: Minimal or absence of nausea and vomiting  Outcome: Progressing  Flowsheets (Taken 5/29/2024 0807)  Minimal or absence of nausea and vomiting: Provide nonpharmacologic comfort measures as appropriate     Problem: Genitourinary - Adult  Goal: Absence of urinary retention  Outcome: 
  Problem: Discharge Planning  Goal: Discharge to home or other facility with appropriate resources  Outcome: Progressing  Flowsheets (Taken 6/1/2024 2000)  Discharge to home or other facility with appropriate resources: Identify barriers to discharge with patient and caregiver     Problem: Safety - Adult  Goal: Free from fall injury  Outcome: Progressing     Problem: ABCDS Injury Assessment  Goal: Absence of physical injury  Outcome: Progressing     Problem: Neurosensory - Adult  Goal: Achieves stable or improved neurological status  Outcome: Progressing  Flowsheets (Taken 6/1/2024 2000)  Achieves stable or improved neurological status: Assess for and report changes in neurological status     Problem: Respiratory - Adult  Goal: Achieves optimal ventilation and oxygenation  Outcome: Progressing  Flowsheets (Taken 6/1/2024 2000)  Achieves optimal ventilation and oxygenation: Assess for changes in respiratory status     Problem: Cardiovascular - Adult  Goal: Maintains optimal cardiac output and hemodynamic stability  Outcome: Progressing  Flowsheets (Taken 6/1/2024 2000)  Maintains optimal cardiac output and hemodynamic stability: Monitor blood pressure and heart rate     Problem: Skin/Tissue Integrity - Adult  Goal: Skin integrity remains intact  Outcome: Progressing  Flowsheets (Taken 6/1/2024 2000)  Skin Integrity Remains Intact: Monitor for areas of redness and/or skin breakdown     Problem: Musculoskeletal - Adult  Goal: Return mobility to safest level of function  Outcome: Progressing  Flowsheets (Taken 6/1/2024 2000)  Return Mobility to Safest Level of Function: Assess patient stability and activity tolerance for standing, transferring and ambulating with or without assistive devices     Problem: Gastrointestinal - Adult  Goal: Minimal or absence of nausea and vomiting  Outcome: Progressing  Flowsheets (Taken 6/1/2024 2000)  Minimal or absence of nausea and vomiting: Administer IV fluids as ordered to ensure 
Problem: Discharge Planning  Goal: Discharge to home or other facility with appropriate resources  Outcome: Adequate for Discharge     Problem: Safety - Adult  Goal: Free from fall injury  Outcome: Adequate for Discharge     Problem: ABCDS Injury Assessment  Goal: Absence of physical injury  Outcome: Adequate for Discharge     Problem: Gastrointestinal - Adult  Goal: Minimal or absence of nausea and vomiting  Outcome: Adequate for Discharge     Problem: Genitourinary - Adult  Goal: Absence of urinary retention  Outcome: Adequate for Discharge     Problem: Metabolic/Fluid and Electrolytes - Adult  Goal: Electrolytes maintained within normal limits  Outcome: Adequate for Discharge     Problem: Skin/Tissue Integrity  Goal: Absence of new skin breakdown  Description: 1.  Monitor for areas of redness and/or skin breakdown  2.  Assess vascular access sites hourly  3.  Every 4-6 hours minimum:  Change oxygen saturation probe site  4.  Every 4-6 hours:  If on nasal continuous positive airway pressure, respiratory therapy assess nares and determine need for appliance change or resting period.  Outcome: Adequate for Discharge  
respiratory status   Assess for changes in mentation and behavior   Position to facilitate oxygenation and minimize respiratory effort   Oxygen supplementation based on oxygen saturation or arterial blood gases  6/2/2024 1421 by Marah Roman LPN  Outcome: Progressing     Problem: Cardiovascular - Adult  Goal: Maintains optimal cardiac output and hemodynamic stability  6/2/2024 2235 by Rishi Jiang, RN  Flowsheets (Taken 6/2/2024 2035)  Maintains optimal cardiac output and hemodynamic stability:   Monitor blood pressure and heart rate   Monitor urine output and notify Licensed Independent Practitioner for values outside of normal range   Assess for signs of decreased cardiac output  6/2/2024 1421 by Marah Roman LPN  Outcome: Progressing     
status   Maintain blood pressure and fluid volume within ordered parameters to optimize cerebral perfusion and minimize risk of hemorrhage   Monitor temperature, glucose, and sodium. Initiate appropriate interventions as ordered  5/29/2024 1728 by Yana Decker RN  Outcome: Progressing  Flowsheets (Taken 5/29/2024 0807)  Achieves stable or improved neurological status: Assess for and report changes in neurological status     Problem: Respiratory - Adult  Goal: Achieves optimal ventilation and oxygenation  5/30/2024 0102 by Oziel Camargo RN  Outcome: Progressing  Flowsheets (Taken 5/30/2024 0102)  Achieves optimal ventilation and oxygenation:   Assess for changes in respiratory status   Assess for changes in mentation and behavior   Position to facilitate oxygenation and minimize respiratory effort   Encourage broncho-pulmonary hygiene including cough, deep breathe, incentive spirometry   Assess and instruct to report shortness of breath or any respiratory difficulty  5/29/2024 1728 by Yana Decker RN  Outcome: Progressing  Flowsheets (Taken 5/29/2024 0807)  Achieves optimal ventilation and oxygenation: Assess for changes in respiratory status     Problem: Cardiovascular - Adult  Goal: Maintains optimal cardiac output and hemodynamic stability  5/30/2024 0102 by Oziel Camargo RN  Outcome: Progressing  Flowsheets (Taken 5/30/2024 0102)  Maintains optimal cardiac output and hemodynamic stability:   Monitor blood pressure and heart rate   Monitor urine output and notify Licensed Independent Practitioner for values outside of normal range   Assess for signs of decreased cardiac output  5/29/2024 1728 by Yana Decker RN  Outcome: Progressing  Flowsheets (Taken 5/29/2024 0807)  Maintains optimal cardiac output and hemodynamic stability: Monitor blood pressure and heart rate     Problem: Skin/Tissue Integrity - Adult  Goal: Skin integrity remains intact  5/30/2024 0102 by Oziel Camargo RN  Outcome:

## 2024-06-04 NOTE — DISCHARGE SUMMARY
extended release tablet  Commonly known as: KLOR-CON     Spiriva Respimat 1.25 MCG/ACT Aers inhaler  Generic drug: tiotropium     spironolactone 25 MG tablet  Commonly known as: ALDACTONE            STOP taking these medications      losartan-hydroCHLOROthiazide 100-12.5 MG per tablet  Commonly known as: HYZAAR     POTASSIUM ACETATE               Where to Get Your Medications        These medications were sent to [address] [address] [place] - P [phone] - F [phone]  [address] [facility] [address]      Phone: [phone]   benzocaine-menthol 15-3.6 MG lozenge  furosemide 40 MG tablet  levoFLOXacin 500 MG tablet  losartan 50 MG tablet  midodrine 5 MG tablet  NIFEdipine 90 MG extended release tablet         Time spent on discharge is 32 mins in patient examination, evaluation, counseling as well as medication reconciliation, prescriptions for required medications, discharge plan and follow up.    Electronically signed by   Dustin Hidalgo MD  6/4/2024  11:20 AM      Thank you Miguel Hood Jr., MD for the opportunity to be involved in this patient's care.

## 2024-06-04 NOTE — PROGRESS NOTES
Pharmacy Note     Renal Dose Adjustment    Shashi Rodriguez is a 89 y.o. male. Pharmacist assessment of renally cleared medications.    Recent Labs     06/01/24  0735   BUN 25*       Recent Labs     06/01/24  0735   CREATININE 1.2       Estimated Creatinine Clearance: 41 mL/min (based on SCr of 1.2 mg/dL).      Height:   Ht Readings from Last 1 Encounters:   05/30/24 1.702 m (5' 7\")     Weight:  Wt Readings from Last 1 Encounters:   05/30/24 83.9 kg (185 lb)       The following medication dose has been adjusted based upon renal function per P&T Guidelines:             Levaquin 750mg PO every 24 hours changed to every 48 hours.     Minal Floyd, PharmD  6/3/2024  8:02 PM         
    Today's Date: 5/31/2024  Patient Name: Shashi Rodriguez  Date of admission: 5/28/2024  7:08 PM  Patient's age: 89 y.o., 2/19/1932  Admission Dx: SOTERO (acute kidney injury) (HCC) [N17.9]  Pancytopenia (HCC) [D61.818]  Anemia, unspecified type [D64.9]  Neutropenia, unspecified type (HCC) [D70.9]    Reason for Consult: management recommendations  Requesting Physician: Keyshawn Dumont DO    CHIEF COMPLAINT: Weakness fatigue abnormal labs  Interim history      Interval history:    Patient seen and examined  Lab work reviewed  Patient resting comfortably  Cell counts remain low neutrophil count 0.11.  Patient afebrile cultures negative  Patient states he does not want any aggressive workup and he is interested in hospice      Patient seen and evaluated.  Still feels weak but overall condition is stable.  No active bleeding.  HISTORY OF PRESENT ILLNESS:      The patient is a 89 y.o.  male who is admitted to the hospital with chief complaint of shortness of breath and abnormal lab workup including low hemoglobin.  Lab workup shows pancytopenia.  Patient has past medical history of COPD sleep apnea basal cell carcinoma of the face.  Patient has been not feeling well for a month now.  Complains of extreme weakness weight loss loss of appetite.  Patient denies chest pain fever chills cough nausea vomiting.  Patient CODE STATUS is DNR CCA.    Patient CBC from 2017 was unremarkable however we do not have any CBC for comparison between 2017 and 2024.  Patient CBC from today shows WBC count of 1.0 hemoglobin 7.4 with MCV of 101.  Platelet count 66,000.  Patient has absolute neutrophil count of 0.2.  No blasts reported in the peripheral blood.  Patient's chemistry showed creatinine of 2.1.  Bicarb of 19    Past Medical History:   has a past medical history of Hernia, Kidney stones, and Sleep apnea.    Past Surgical History:   has a past surgical history that includes Tonsillectomy (1950); Vasectomy (1978); Total knee 
   05/31/24 0893   Care Plan - Respiratory Goals   Achieves optimal ventilation and oxygenation Assess for changes in respiratory status;Position to facilitate oxygenation and minimize respiratory effort;Oxygen supplementation based on oxygen saturation or arterial blood gases;Assess and instruct to report shortness of breath or any respiratory difficulty;Respiratory therapy support as indicated       
  Pharmacy Note - Renal Dosing and Extended Infusion Beta-Lactam Adjustment    Cefepime  500mg Q24h  for treatment of Aspiration Pneumonia. Per [facility] Renal Dose Adjustment Policy and Extended Infusion Beta-Lactam Policy, cefepime will be changed to 2000mg load followed by 1000mg Q12h extended infusion    Estimated Creatinine Clearance: Estimated Creatinine Clearance: 23 mL/min (A) (based on SCr of 2.1 mg/dL (H)).    BMI: Body mass index is 28.98 kg/m².    Please call with any questions.    Thank you,    Parish Meade, [facility]  
  Physician Progress Note      PATIENT:               FANNY ZHONG  CSN #:                  [id number]  :                       1932  ADMIT DATE:       2024 7:08 PM  DISCH DATE:  RESPONDING  PROVIDER #:        Keyshawn Dumont DO          QUERY TEXT:    Pt admitted with pancytopenia. Pt noted to have aspiration pneumonia. If   possible, please document in the progress notes and discharge summary if you   are evaluating and /or treating any of the following:    The medical record reflects the following:  Risk Factors: pancytopenia, aspiration pneumonia  Clinical Indicators:  - medicine PN - Dysphagia with clinical aspiration   pneumonia.  Patient witnessed by nursing staff and hematology/oncology be   choking on breakfast.  Made NPO.  Speech therapy evaluation.  Possible need   for barium swallow. WBC- 1.1, 1.0, 1.1, 0.9, 0.9. RBC- 2.35, 2.18, 2.17, 2.15,   2.18. PLT- 135, 149, 149, 133, 136. procalcitonin- 1.53  Treatment: IV Cefepime,  IVF - NS  @75;  ML Bolus X2.  Monitor daily   CBC.      Thank you, please contact me for any questions.  Nomi Putnam RN, CDS  cell- [phone]  office hours - [id number]  Options provided:  -- Sepsis due to aspiration pneumonia, present on admission  -- Aspiration pneumonia and pancytopenia without Sepsis  -- Other - I will add my own diagnosis  -- Disagree - Not applicable / Not valid  -- Disagree - Clinically unable to determine / Unknown  -- Refer to Clinical Documentation Reviewer    PROVIDER RESPONSE TEXT:    This patient has Aspiration pneumonia and pancytopenia without Sepsis.    Query created by: Nomi Putnam on 2024 8:39 AM      Electronically signed by:  Keyshawn Dumont DO 2024 1:45 PM          
Attempted to remove aparicio from pt, educated on why and other options for voiding. Pt refused removal. Physician updated and gave approval for aparicio to stay in place for palliative reasons.  
[facility]  Office: [phone]  Jeff Cortes DO, Duong Perez, DO, Chintan Rojas DO, Mark Son, DO, Panfilo Webster MD, Guillermina Ruelas MD, Georgia Chaudhry MD, Demetra Lovett MD,  Jame Mar MD, Tahir Medina MD, Monica Zaays MD,  Meño Vasquez DO, Ventura Lewis MD, Michael Choi MD, Nomi Cortes DO, Rona Mcmanus MD,  Keyshawn Dumont DO, Vicky Peterson MD, Shikha Perdomo MD, Leigh Kerns MD, Juan Dwyer MD,  Jose Alfredo Logan MD, Chrissy Buck MD, Bibi Anne MD, Josh Esqueda MD, Dustin Hidalgo MD, Hector Alvarez MD, Sonido Kim DO, Manuelito Martinez DO, Haider Orourke MD,  Kavon Mclaughlin MD, Shirley Waterhouse, CNP,  Lesley Haley CNP, Philip Solomon, CNP,  Maday Lechuga, DNP, Shalini Vidal, CNP, Dorina Hassan, CNP, Veronica Robin, CNP, Madelyn Holm, CNP, Linsey Cancino, PA-C, Tiara Royal, PA-C, Makayla Whiting, CNP, Clarissa Balderrama, CNP, Gerson Kuhn, CNP, Rebeca Porras, CNP, Annita Miller, CNP, Corazon Trejo, CNS, Esme Dill, CNP, Christine Otoole, CNP, Tracy Schwab, CNP         [facility]   IN-PATIENT SERVICE   [facility]    Progress Note    5/31/2024    7:17 AM    Name:   Shashi Rodriguez  MRN:     [id number]     Acct:      [id number]   Room:   307/307-01   Day:  3  Admit Date:  5/28/2024  7:08 PM    PCP:   Miguel Kamara Jr., MD  Code Status:  DNR-CCA    Subjective:     C/C:   Chief Complaint   Patient presents with    Shortness of Breath     Sent to ED by PCP, abnormal labs today (low hgb, low wbc...); pt reports \"things are just slowing down\", SOB, \"blurry eyes\" X 2 weeks;      Interval History Status: not changed.     Vitals reviewed, afebrile and hemodynamically stable. Saturating well on room air.  Labs reviewed, elevated BUN and creatinine 26 and 1.3 respectively. Procalcitonin was elevated 1.53.  Leukopenia 0.9, hemoglobin 7.4, platelets 136.  MRSA swab negative. Remains on cefepime.   Overnight patient had 
Dr. Hidalgo and family at bedside to discuss patient prognosis. At this time, patient expresses desire for DNR-CC status and discharging to longterm care facility with Hospice services following. Patient declines blood transfusion for critical hemoglobin levels. Provider grants intravenous access removal and discontinuation of IV antibiotics.   
[facility]  Office: [phone]  Jeff Cortes DO, Duong Perez, DO, Chintan Rojas DO, Mark Son, DO, Panfilo Webster MD, Guillermina Ruelas MD, Georgia Chaudhry MD, Demetra Lovett MD,  Jame Mar MD, Tahir Medina MD, Monica Zayas MD,  Meño Vasquez DO, Ventura Lewis MD, Michael Choi MD, Nomi Cortes DO, oRna Mcmanus MD,  Keyshawn Dumont DO, Vicky Peterson MD, Shikha Perdomo MD, Leigh Kerns MD, Juan Dwyer MD,  Jose Alfredo Logan MD, Chrissy Buck MD, Bibi Anne MD, Josh Esqueda MD, Dustin Hidalgo MD, Hector Alvarez MD, Sonido Kim DO, Manuelito Martinez DO, Haider Orourke MD,  Kavon Mclaughlin MD, Shirley Waterhouse, CNP,  Lesley Haley CNP, Philip Solomon, CNP,  Maday Lechuga, DNP, Shalini Vidal, CNP, Dorina Hassan, CNP, Veronica Robin, CNP, Madelyn Holm, CNP, Linsey Cancino, PA-C, Tiara Royal, PA-C, Makayla Whiting, CNP, Clarissa Balderrama, CNP, Gerson Kuhn, CNP, Rebeca Porras, CNP, Annita Miller, CNP, Corazon Trejo, CNS, Esme Dill, CNP, Christine Otoole, CNP, Tracy Schwab, CNP         [facility]   IN-PATIENT SERVICE   [facility]    Progress Note    5/30/2024    7:22 AM    Name:   Shashi Rodriguez  MRN:     [id number]     Acct:      [id number]   Room:   307/307-01   Day:  2  Admit Date:  5/28/2024  7:08 PM    PCP:   Miguel Kamara Jr., MD  Code Status:  DNR-CCA    Subjective:     C/C:   Chief Complaint   Patient presents with    Shortness of Breath     Sent to ED by PCP, abnormal labs today (low hgb, low wbc...); pt reports \"things are just slowing down\", SOB, \"blurry eyes\" X 2 weeks;      Interval History Status: not changed.     Vitals reviewed, afebrile and hemodynamically stable.  Saturating well on room air.  Labs reviewed, elevated BUN and creatinine 28 and 1.6 respectively.  Procalcitonin was elevated 1.53.  Leukopenia 0.9, hemoglobin 7.2, platelets 133.  Overnight patient had episodes of concern for aspiration.    On 
[facility]  Speech Language Pathology    Date: 6/3/2024  Patient Name: Shashi Rodriguez  YOB: 1932   AGE: 89 y.o.  MRN: [id number]        Patient Not Available for Speech Therapy     Due to:  [] Testing  [] Hemodialysis  [] Cancelled by RN  [] Surgery   [] Intubation/Sedation/Pain Medication  [] Medical instability  [x] Other: ST recommends MBSS.  Spoke with RN, pt. With hospice consult.  Will hold MBSS at this time.     Next scheduled treatment: as appropriate/needed  Completed by: KIRAN HUGGINS, SLP, M.A. CCC-SLP    
Occupational Therapy      [facility]  Occupational Therapy Not Seen Note    DATE: 6/3/2024    NAME: Shashi Rodriguez  MRN: [id number]   : 1932      Patient not seen this date for Occupational Therapy due to:    Patient Declined: Pt politely declines any attempt of mobility/ADL participation today. Pt and his family met with hospice this morning to discuss goals of care. Pt states he doesn't want to get weaker and wants to be able to do basic mobility and self care tasks but just isn't up for it today. Writer inquired if pt wished to continue to work with therapy during hospital stay- pt states he doesn't feel up to it today but is ok with therapy returning tomorrow. RN notified.     Next Scheduled Treatment: Will check back 2024 as able     Electronically signed by Vannesas Hargrove OT on 6/3/2024 at 3:32 PM    
Occupational Therapy      [facility]  Occupational Therapy Not Seen Note    DATE: 2024    NAME: Shashi Rodriguez  MRN: [id number]   : 1932      Patient not seen this date for Occupational Therapy due to:      Other: Per RN, pt not appropriate for therapy this AM as pt hypotensive      Next Scheduled Treatment: Will check back PM as able or 2024    Electronically signed by Vannessa Hargrove OT on 2024 at 11:58 AM    
[facility]  Office: [phone]  Jeff Cortes DO, Duong Perez, DO, Chintan Rojas DO, Mark Son, DO, Panfilo Webster MD, Guillermina Ruelas MD, Georgia Chaudhry MD, Demetra Lovett MD,  Jame Mar MD, Tahir Medina MD, Monica Zayas MD,  Meño Vasquez DO, Ventura Lewis MD, Michael Choi MD, Nomi Cortes DO, Rona Mcmanus MD,  Keyshawn Dumont DO, Vicky Peterson MD, Shikha Perdomo MD, Leigh Kerns MD, Juan Dwyer MD,  Jose Alfredo Logan MD, Chrissy Buck MD, Bibi Anne MD, Josh Esqueda MD, Dustin Hidalgo MD, Hector Alvarez MD, Sonido Kim DO, Manuelito Martinez DO, Haider Orourke MD,  Kavon Mclaughlin MD, Shirley Waterhouse, CNP,  Lesley Haley CNP, Philip Solomon, CNP,  Maday Lechuga, DNP, Shalini Vidal, CNP, Dorina Hassan, CNP, Veronica Robin, CNP, Madelyn Holm, CNP, Linsey Cancino, PA-C, Tiara Royal, PA-C, Makayla Whiting, CNP, Clarissa Balderrama, CNP, Gerson Kuhn, CNP, Rebeca Porras, CNP, Annita Miller, CNP, Corazon Trejo, CNS, Esme Dill, CNP, Christine Otoole, CNP, Tracy Schwab, CNP         [facility]   IN-PATIENT SERVICE   [facility]    Progress Note    6/2/2024    7:25 AM    Name:   Shashi Rodriguez  MRN:     [id number]     Acct:      [id number]   Room:   307/307-01   Day:  5  Admit Date:  5/28/2024  7:08 PM    PCP:   Miguel Kamara Jr., MD  Code Status:  DNR-CCA    Subjective:     C/C:   Chief Complaint   Patient presents with    Shortness of Breath     Sent to ED by PCP, abnormal labs today (low hgb, low wbc...); pt reports \"things are just slowing down\", SOB, \"blurry eyes\" X 2 weeks;      Interval History Status: not changed.     Vitals reviewed, afebrile and hemodynamically stable. Saturating well on room air.  Labs reviewed, leukocyte count 1.0, hemoglobin 7.1, platelets 144.  MRSA swab negative. Remains on cefepime with plan to continue x 7 days.   Overnight patient had no significant events.     On examination patient 
Physical Therapy                                                                     Physical Therapy Cancel Note      DATE: 2024    NAME: Shashi Rodriguez  MRN: [id number]   : 1932      Patient not seen this date for Physical Therapy due to:    Other: RN defer PT evaluation this morning  due to WBC and MAPS low.   PLAN: continue to pursue PT evaluation as able.       Electronically signed by Angelica Root PT on 2024 at 11:35 AM      
Physical Therapy        Physical Therapy Cancel Note      DATE: 6/3/2024    NAME: Shashi Rodriguez  MRN: [id number]   : 1932      Patient not seen this date for Physical Therapy due to:    Patient Declined: Pt politely declines any attempt of mobility today. Pt and his family met with hospice this morning to discuss goals of care. Pt states he doesn't want to get weaker and wants to be able to do basic mobility but just isn't up for it today. Writer inquired if pt wished to continue to work with PT during hospital stay- pt states he doesn't feel up to it today but ok with therapy returning tomorrow. RN notified.       Electronically signed by Jessie Meyer PT on 6/3/2024 at 2:18 PM      
Physical Therapy  Facility/Department: [address]  Physical Therapy Initial Assessment    Name: Shashi Rodriguez  : 1932  MRN: [id number]  Date of Service: 2024    Chief Complaint   Patient presents with    Shortness of Breath     Sent to ED by PCP, abnormal labs today (low hgb, low wbc...); pt reports \"things are just slowing down\", SOB, \"blurry eyes\" X 2 weeks;       Discharge Recommendations:  Patient would benefit from continued therapy after discharge   PT Equipment Recommendations  Equipment Needed: No (pt states he has rolling walker at home)      Patient Diagnosis(es): The primary encounter diagnosis was Neutropenia, unspecified type (HCC). Diagnoses of Anemia, unspecified type, SOTERO (acute kidney injury) ([facility]), Elevated troponin, and Elevated brain natriuretic peptide (BNP) level were also pertinent to this visit.  Past Medical History:  has a past medical history of Hernia, Kidney stones, and Sleep apnea.  Past Surgical History:  has a past surgical history that includes Tonsillectomy (); Vasectomy (); Total knee arthroplasty (10/2005); and Cataract removal.    Assessment   Body Structures, Functions, Activity Limitations Requiring Skilled Therapeutic Intervention: Decreased functional mobility ;Decreased safe awareness;Decreased endurance    Assessment: Pt presents from home alone, ambulates independently no device, ind ADL's. Pt admitted with SOB and fatigue, decrease hgb and WBC's. Pt currently requiring SBA for bed mobility, min assist transfers, pt ambulated 6ft no device min assist x 1 for unsteadiness. Pt ambulated 25ft with rolling walker and min assist. Pt does not demonstrate adequate safety for return to prior living situation. Pt will benefit from continued skilled PT for endurance, safety, balance and functional mobility training while in the hospital and at discharge.    Therapy Prognosis: Good    Decision Making: Medium Complexity    Requires PT Follow-Up: Yes    Activity 
Pt refused IV access today. Vitals stable. Will continue to monitor.   
Pt's aparicio removed prior to pickup. Pt's son carried pt's belongings during transport. Pt stable for discharge.   
Received critical lab result of 1.0 WBC. Secured message sent to Dr. Tim DO, at 6708. No new orders at this time.  
SLP ALL NOTES  Facility/Department: [address]   CLINICAL BEDSIDE SWALLOW EVALUATION    NAME: Shashi Rodriguez  : 1932  MRN: [id number]    ADMISSION DATE: 2024  ADMITTING DIAGNOSIS: has Neoplasm of uncertain behavior; BCC (basal cell carcinoma), face; Pancytopenia (HCC); COPD (chronic obstructive pulmonary disease) (HCC); Hypertension; Benign prostatic hyperplasia with lower urinary tract symptoms; Stage 4 chronic kidney disease (HCC); Other cirrhosis of liver (HCC); Splenomegaly; Dysphagia; Normal anion gap metabolic acidosis; and SOTERO (acute kidney injury) (HCC) on their problem list.    Recent Chest Xray: 24  IMPRESSION:  No acute cardiopulmonary disease.    Date of Eval: 2024  Evaluating Therapist: RAGHU Elmore    Current Diet level:  Current Diet : NPO  Current Liquid Diet : NPO    Primary Complaint  Shashi Rodriguez is a 89 y.o. Non- / non  male who presents with Shortness of Breath (Sent to ED by PCP, abnormal labs today (low hgb, low wbc...); pt reports \"things are just slowing down\", SOB, \"blurry eyes\" X 2 weeks; )   and is admitted to the hospital for the management of Pancytopenia (HCC).     Mr. Rodriguez is a pleasant 89 year old gentleman with past medical history including COPD, sleep apnea (CPAP use), hypertension, and basal cell carcinoma of his face being evaluated for a 3 weak onset of night sweats, extreme fatigue, weakness, loss of appetite, shortness of breath, and vision changes.  He  states he went to his Primary Care Physician and had some lab work completed and was called and told it was abnormal, so presented tot the ER for further evaluation.  Upon presenting to the ER, the patient is afebrile, slightly hypotensive, labs: Na: 140, K: 4.5, BUN/Crt: 42/2.5, pro-BNP: 1,010, troponin: 53, 49, WBC: 1.1, Hgb: 7.8, Plt: 135.       Upon exam, the patient is resting in bed on room air.  He reports continued fatigue and shortness of breath with exertion.  He 
SPEECH LANGUAGE PATHOLOGY  Speech Language Pathology  [address]    Date: 6/4/2024  Patient Name: Shashi Rodriguez  YOB: 1932   AGE: 89 y.o.  MRN: [id number]        Patient Not Available for Speech Therapy     Due to:  [] Testing  [] Hemodialysis  [] Cancelled by RN  [] Surgery   [] Intubation/Sedation/Pain Medication  [] Medical instability  [] Refusal  [x] Other: Pt to d/c on hospice    Completed by: RAGHU Emlore, [unfilled]   
SPIRITUAL CARE DEPARTMENT [facility]  PROGRESS NOTE    Room # 307/307-01   Name: Shashi Rodriguez            Alevism: Sabianist    Reason for visit: Routine    I visited the patient.    Admit Date & Time: 5/28/2024  7:08 PM    Assessment:  Shashi Rodriguez is a 89 y.o. male in the hospital because Anemia. Upon entering the room Pt was open to conversation and was welcoming and wanting to talk.      Intervention:  I introduced myself and my title as  I offered space for Pt  to express feelings, needs, and concerns and provided a ministry presence.  offered care and support to Pt.  provided empathic listening and prayer as requested by Pt.     Outcome:  Pt expressed concerns about family and gen. Pt appreciated  visit.    Plan:  Chaplains will remain available to offer spiritual and emotional support as needed.    Electronically signed by Chaplain VIOLETA, on 5/30/2024 at 7:28 PM.  Spiritual Care Department  [facility]     
 40 mg IntraVENous Daily    cefepime  1,000 mg IntraVENous Q12H    tamsulosin  0.4 mg Oral Daily    tiotropium  1 puff Inhalation Daily     Continuous Infusions:       PRN Meds: melatonin, sodium chloride, Dextromethorphan-guaiFENesin, benzocaine-menthol, midodrine, ondansetron **OR** ondansetron    Data:     Past Medical History:   has a past medical history of Hernia, Kidney stones, and Sleep apnea.    Social History:   reports that he has quit smoking. He has never used smokeless tobacco. He reports current alcohol use.      Family History: No family history on file.    Vitals:  BP (!) 104/49   Pulse 66   Temp 98.1 °F (36.7 °C) (Oral)   Resp 16   Ht 1.702 m (5' 7\")   Wt 83.9 kg (185 lb)   SpO2 92%   BMI 28.98 kg/m²   Temp (24hrs), Av °F (36.7 °C), Min:97.9 °F (36.6 °C), Max:98.1 °F (36.7 °C)    No results for input(s): \"POCGLU\" in the last 72 hours.    I/O (24Hr):    Intake/Output Summary (Last 24 hours) at 2024 1101  Last data filed at 2024 0610  Gross per 24 hour   Intake --   Output 1950 ml   Net -1950 ml         Labs:  Hematology:  Recent Labs     24  0601 24  0542   WBC  --  1.0* 1.4*   RBC  --  2.11* 2.03*   HGB 7.6* 7.1* 6.9*   HCT 21.9* 21.2* 20.3*   MCV  --  100.8* 99.8   MCH  --  33.7 33.8   MCHC  --  33.4 33.9   RDW  --  15.3 15.0   PLT  --  144 146   MPV  --  8.0 8.0       Chemistry:  No results for input(s): \"NA\", \"K\", \"CL\", \"CO2\", \"GLUCOSE\", \"BUN\", \"CREATININE\", \"MG\", \"ANIONGAP\", \"LABGLOM\", \"GFRAA\", \"CALCIUM\", \"CAION\", \"PHOS\", \"PSA\", \"PROBNP\", \"TROPHS\", \"CKTOTAL\", \"CKMB\", \"CKMBINDEX\", \"MYOGLOBIN\", \"DIGOXIN\", \"LACTACIDWB\" in the last 72 hours.    No results for input(s): \"PROT\", \"LABALBU\", \"LABA1C\", \"J8WCINJ\", \"J3OFFGP\", \"FT4\", \"TSH\", \"AST\", \"ALT\", \"LDH\", \"GGT\", \"ALKPHOS\", \"BILITOT\", \"BILIDIR\", \"AMMONIA\", \"AMYLASE\", \"LIPASE\", \"LACTATE\", \"CHOL\", \"HDL\", \"CHOLHDLRATIO\", \"TRIG\", \"VLDL\", \"UII53GL\", \"PHENYTOIN\", \"PHENYF\", \"URICACID\", \"POCGLU\" in the last 72 
dynamic-MIN)     AROM: Within functional limits  Strength: Within functional limits  Coordination: Within functional limits    ADL  Feeding: Independent  Grooming: Contact guard assistance  Grooming Skilled Clinical Factors: comb hair  UE Bathing: Contact guard assistance;Based on clinical judgement  LE Bathing: Based on clinical judgement;Contact guard assistance  UE Dressing: Stand by assistance;Based on clinical judgement  LE Dressing: Stand by assistance  LE Dressing Skilled Clinical Factors: don slipper socks  Toileting: Dependent/Total  Toileting Skilled Clinical Factors: aparicio catheter  Functional Mobility: Stand by assistance;Minimal assistance  Functional Mobility Skilled Clinical Factors: bed mob SBA, sit>stand MIN, stand>sit CGA, amb RW MIN  Skin Care: N/A       Bed mobility  Supine to Sit: Stand by assistance (use of bed rail. Noted increase time and effort)  Scooting: Supervision  Bed Mobility Comments: Pt sat on edge of bed for ~3-4' to don slipper socks    Transfers  Sit to stand: Minimal assistance  Stand to sit: Contact guard assistance  Transfer Comments: cues for hand placement    Vision  Vision Exceptions: Wears glasses at all times  Hearing  Hearing: Exceptions to WFL  Hearing Exceptions: Hard of hearing/hearing concerns    Cognition  Overall Cognitive Status: Exceptions  Arousal/Alertness: Delayed responses to stimuli  Following Commands: Follows one step commands consistently;Follows multistep commands with increased time;Follows multistep commands with repitition  Attention Span: Attends with cues to redirect  Memory: Appears intact  Safety Judgement: Decreased awareness of need for assistance;Decreased awareness of need for safety  Problem Solving: Assistance required to generate solutions;Decreased awareness of errors  Insights: Decreased awareness of deficits  Initiation: Requires cues for some  Sequencing: Requires cues for some  Orientation  Overall Orientation Status: Within Normal 
   CULTURE NO GROWTH 2 DAYS 05/29/2024 02:26 PM       Radiology:  XR CHEST PORTABLE    Result Date: 5/28/2024  No acute cardiopulmonary disease.     XR CHEST (2 VW)    Result Date: 5/28/2024  No acute process.     Physical Examination:        General appearance:  alert, cooperative and no distress  Mental Status:  oriented to person, place and time and normal affect  Lungs:  there are breath sounds bilaterally. No significant wheeze or rhonchi but poor air movement. Frequent cough.   Heart:  regular rate and rhythm, no murmur  Abdomen:  soft, nontender, nondistended  Extremities:  no edema, redness, tenderness in the calves  Skin:  no gross lesions, rashes, induration    Assessment:        Hospital Problems             Last Modified POA    * (Principal) Pancytopenia (HCC) 5/31/2024 Yes    COPD (chronic obstructive pulmonary disease) (HCC) 5/28/2024 Yes    Hypertension 5/28/2024 Yes    Benign prostatic hyperplasia with lower urinary tract symptoms 5/29/2024 Yes    Stage 4 chronic kidney disease (HCC) 5/29/2024 Yes    Other cirrhosis of liver (HCC) 5/29/2024 Yes    Splenomegaly 5/29/2024 Yes    Dysphagia 5/29/2024 Yes    Normal anion gap metabolic acidosis 5/29/2024 Yes    SOTERO (acute kidney injury) (HCC) 5/29/2024 Yes    Neutropenia (HCC) 5/30/2024 Yes    Loss of appetite 5/31/2024 Yes    ACP (advance care planning) 5/31/2024 Yes    Palliative care encounter 5/31/2024 Yes    Macrocytosis 5/31/2024 Yes   Plan:        Pancytopenia.  Hematology/oncology consulted.  Concern with MDS. Patient not interested in bone marrow biopsy or treatment. Palliative consulted. Plan for conservative treatment with CBC and blood transfusions if needed. Hospice eval.     Cirrhosis with splenomegaly.  Abdominal ultrasound demonstrated cirrhosis with mild splenomegaly. LFTs are currently stable.    Dysphagia with clinical aspiration pneumonia.  Speech cleared for soft and bite sized diet with thin liquids pending barium. However given 
\"BE\", \"THGBART\", \"THB\", \"ZEV6GZW\", \"CFVY0DVK\", \"R9ZCGRBU\", \"O2SAT\", \"FIO2\"  No results found for: \"SPECIAL\"  No results found for: \"CULTURE\"    Radiology:  XR CHEST PORTABLE    Result Date: 5/28/2024  No acute cardiopulmonary disease.     XR CHEST (2 VW)    Result Date: 5/28/2024  No acute process.     Physical Examination:        General appearance:  alert, cooperative and no distress  Mental Status:  oriented to person, place and time and normal affect  Lungs:  there are breath sounds bilaterally but diminished throughout. No significant wheeze or rhonchi but poor air movement.   Heart:  regular rate and rhythm, no murmur  Abdomen:  soft, nontender, nondistended  Extremities:  no edema, redness, tenderness in the calves  Skin:  no gross lesions, rashes, induration    Assessment:        Hospital Problems             Last Modified POA    * (Principal) Pancytopenia (HCC) 5/28/2024 Yes    COPD (chronic obstructive pulmonary disease) (HCC) 5/28/2024 Yes    Hypertension 5/28/2024 Yes    Benign prostatic hyperplasia with lower urinary tract symptoms 5/29/2024 Yes    Stage 4 chronic kidney disease (HCC) 5/29/2024 Yes    Other cirrhosis of liver (HCC) 5/29/2024 Yes    Splenomegaly 5/29/2024 Yes    Dysphagia 5/29/2024 Yes     Plan:        Pancytopenia.  Hematology/oncology consulted.  Recommendations made for peripheral blood smear, flow cytometry, paraproteinemia profile and possible primary bone marrow disorder but following noninvasive procedures first.  Neutropenic precautions.    Cirrhosis with splenomegaly.  Abdominal ultrasound demonstrated cirrhosis with mild splenomegaly.  In verse outpatient GI evaluation.  LFTs are currently stable.    Dysphagia with clinical aspiration pneumonia.  Patient witnessed by nursing staff and hematology/oncology be choking on breakfast.  Made NPO.  Speech therapy evaluation.  Possible need for barium swallow.  Continue IV fluids for gentle hydration.    SOTERO on presumed CKD.  Limited 
patient's wishes.  Plan to go to SNF with hospice following.    Cirrhosis with splenomegaly.  Abdominal ultrasound demonstrated cirrhosis with mild splenomegaly. LFTs are currently stable.    Dysphagia with clinical aspiration pneumonia.  Speech cleared for soft and bite sized diet with thin liquids pending barium.  Will discontinue barium swallow study.  Plan to go with hospice.    SOTERO on presumed CKD.  Resolved.     Normal Anion gap metabolic acidosis.  Resolved.     Hypotension.  Concern for possible aspiration pneumonia.  Sepsis?  Continues on cefepime.  Afebrile.  Repeat chest x-ray with no PNA.  Procalcitonin 1.5. MRSA swab negative. Midodrine as needed for systolic blood pressure less than 100.    Elevated Troponin and proBNP.  Echocardiogram unremarkable.     COPD.  Currently saturating well on room air.  Continue to Spiriva 1 puff daily.    BPH.  Continue Flomax.    DVT prophylaxis: None due to anemia.  GI prophylaxis: Protonix 40 IV daily.     Discharge planning: Palliative evaluation > Hospice eval Monday 6/3/24.  Plan to go to SNF with hospice.  Patient has been on cefepime and can be transition to Levaquin on discharge.    Dustin Hidalgo MD  6/3/2024  5:06 PM     
reflects the content of the visit, the document can still have some errors including those of syntax and sound a like substitutions which may escape proof reading.  It such instances, actual meaning can be extrapolated by contextual diversion.  
cm    IVSd 1.0 0.6 - 1.0 cm    LVIDd 4.2 4.2 - 5.9 cm    LVIDs 2.7 cm    LVOT Diameter 2.2 cm    LVOT Mean Gradient 6 mmHg    LVOT VTI 33.7 cm    LVOT Peak Velocity 1.6 m/s    LVOT Peak Gradient 10 mmHg    LVPWd 1.0 0.6 - 1.0 cm    LV E' Lateral Velocity 10 cm/s    LV E' Septal Velocity 8 cm/s    LVOT Area 3.8 cm2    LVOT .0 ml    MV A Velocity 1.12 m/s    MV E Velocity 0.64 m/s    RV Basal Dimension 4.9 cm    RV Free Wall Peak S' 18 cm/s    TR Max Velocity 2.97 m/s    TR Peak Gradient 35 mmHg    Body Surface Area 1.99 m2    Fractional Shortening 2D 36 28 - 44 %    LVIDd Index 2.14 cm/m2    LVIDs Index 1.38 cm/m2    LV RWT Ratio 0.48     LV Mass 2D 137.2 88 - 224 g    LV Mass 2D Index 70.0 49 - 115 g/m2    MV E/A 0.57     E/E' Ratio (Averaged) 7.20     E/E' Lateral 6.40     E/E' Septal 8.00     LA Volume Index BP 33 16 - 34 ml/m2    LVOT Stroke Volume Index 65.3 mL/m2    LA Volume Index MOD A2C 29 16 - 34 ml/m2    LA Volume Index MOD A4C 36 (A) 16 - 34 ml/m2    LA Size Index 2.40 cm/m2    LA/AO Root Ratio 1.15     Ao Root Index 2.09 cm/m2    AV Velocity Ratio 0.84     LVOT:AV VTI Index 0.89     RONN/BSA VTI 1.7 cm2/m2    RONN/BSA Peak Velocity 1.6 cm2/m2    Est. RA Pressure 8 mmHg    RVSP 43 mmHg         IMAGING DATA:    XR CHEST PORTABLE    Result Date: 5/28/2024  EXAMINATION: ONE XRAY VIEW OF THE CHEST 5/28/2024 7:28 pm COMPARISON: 05/28/2024. HISTORY: ORDERING SYSTEM PROVIDED HISTORY: shortness of breath TECHNOLOGIST PROVIDED HISTORY: shortness of breath Reason for Exam: sob FINDINGS: The cardiomediastinal silhouette is stable.  Aortic vascular calcification with mild tortuosity.  The lungs are clear.  No infiltrate, pleural fluid or evidence of overt failure.     No acute cardiopulmonary disease.     XR CHEST (2 VW)    Result Date: 5/28/2024  EXAMINATION: TWO XRAY VIEWS OF THE CHEST 5/28/2024 12:04 pm COMPARISON: None. HISTORY: ORDERING SYSTEM PROVIDED HISTORY: Shortness of breath TECHNOLOGIST PROVIDED 
morphology of liver with splenomegaly.  Discussed with pathologist.  Peripheral smear showed last, 6-7%.  Patient likely has underlying bone marrow disorder.  Patient is very clear in his goals he does not want any aggressive workup and stated he is interested in hospice.  Hospice evaluation done and patient signed  Oncology will sign off    We will follow with you    Thank you for asking us to see this patient.        Sharona Villanueva MD              This note is created with the assistance of a speech recognition program.  While intending to generate a document that actually reflects the content of the visit, the document can still have some errors including those of syntax and sound a like substitutions which may escape proof reading.  It such instances, actual meaning can be extrapolated by contextual diversion.

## 2024-06-04 NOTE — CARE COORDINATION
Writer spoke with Leia @ [facility] patient to have hospital bed and trapeze delivered to [facility] today.  Patient to transport@3pm. Family notified.     1230  [facility] to transport by stretcher @ 4pm to [facility]  Michael son, Sulema @ [facility] and Leland Ko notified.    RN Report [phone]    Packet  AVS/GALA  24hr Mar  H & P  D[facility]

## 2024-06-05 ENCOUNTER — TELEPHONE (OUTPATIENT)
Dept: ONCOLOGY | Age: 89
End: 2024-06-05

## 2024-06-05 ENCOUNTER — HOSPITAL ENCOUNTER (EMERGENCY)
Age: 89
Discharge: HOME | End: 2024-06-05
Attending: EMERGENCY MEDICINE
Payer: MEDICARE

## 2024-06-05 VITALS
OXYGEN SATURATION: 96 % | SYSTOLIC BLOOD PRESSURE: 162 MMHG | DIASTOLIC BLOOD PRESSURE: 64 MMHG | BODY MASS INDEX: 31.34 KG/M2 | TEMPERATURE: 98.5 F | HEIGHT: 66 IN | RESPIRATION RATE: 19 BRPM | WEIGHT: 195 LBS | HEART RATE: 76 BPM

## 2024-06-05 DIAGNOSIS — R33.9 URINARY RETENTION: Primary | ICD-10-CM

## 2024-06-05 PROCEDURE — 99283 EMERGENCY DEPT VISIT LOW MDM: CPT

## 2024-06-05 ASSESSMENT — PAIN DESCRIPTION - ORIENTATION: ORIENTATION: MID;LOWER

## 2024-06-05 ASSESSMENT — PAIN - FUNCTIONAL ASSESSMENT: PAIN_FUNCTIONAL_ASSESSMENT: 0-10

## 2024-06-05 ASSESSMENT — PAIN DESCRIPTION - LOCATION: LOCATION: ABDOMEN

## 2024-06-05 ASSESSMENT — PAIN SCALES - GENERAL: PAINLEVEL_OUTOF10: 10

## 2024-06-05 NOTE — DISCHARGE INSTRUCTIONS
Continue with hospice care    Keep Durand in place    If any symptoms worsen or any new or concerning symptoms arise return immediately to the emergency

## 2024-06-05 NOTE — ED TRIAGE NOTES
Pt sent over from [facility] assisted [facility] for urinary retention, pt had aparicio removed and has been unable to void for approx 16 hours

## 2024-06-05 NOTE — DISCHARGE INSTR - COC
Isolation/Infection:   Isolation            No Isolation          Patient Infection Status       None to display            Nurse Assessment:  Last Vital Signs: BP (!) 162/64   Pulse 76   Temp 98.5 °F (36.9 °C) (Oral)   Resp 19   Ht 1.676 m (5' 6\")   Wt 88.5 kg (195 lb)   SpO2 96%   BMI 31.47 kg/m²     Last documented pain score (0-10 scale): Pain Level: 10  Last Weight:   Wt Readings from Last 1 Encounters:   06/05/24 88.5 kg (195 lb)     Mental Status:  {IP PT MENTAL STATUS:20030}    IV Access:  { GALA IV ACCESS:264263314}    Nursing Mobility/ADLs:  Walking   {CHP DME ADLs:916197629}  Transfer  {CHP DME ADLs:069052971}  Bathing  {CHP DME ADLs:030789346}  Dressing  {CHP DME ADLs:039820050}  Toileting  {CHP DME ADLs:600806726}  Feeding  {CHP DME ADLs:553659689}  Med Admin  {P DME ADLs:448838599}  Med Delivery   { GALA MED Delivery:308993525}    Wound Care Documentation and Therapy:        Elimination:  Continence:   Bowel: {YES / NO:19727}  Bladder: {YES / NO:19727}  Urinary Catheter: {Urinary Catheter:302099087}   Colostomy/Ileostomy/Ileal Conduit: {YES / NO:19727}       Date of Last BM: ***    Intake/Output Summary (Last 24 hours) at 6/5/2024 1004  Last data filed at 6/5/2024 0906  Gross per 24 hour   Intake --   Output 1100 ml   Net -1100 ml     No intake/output data recorded.    Safety Concerns:     { GALA Safety Concerns:960131112}    Impairments/Disabilities:      { GALA Impairments/Disabilities:550261882}    Nutrition Therapy:  Current Nutrition Therapy:   { GLAA Diet List:401632959}    Routes of Feeding: {P DME Other Feedings:138653628}  Liquids: {Slp liquid thickness:62532}  Daily Fluid Restriction: {CHP DME Yes amt example:198529768}  Last Modified Barium Swallow with Video (Video Swallowing Test): {Done Not Done Date:304088012}    Treatments at the Time of Hospital Discharge:   Respiratory Treatments: ***  Oxygen Therapy:  {Therapy; copd oxygen:42054}  Ventilator:    { CC Vent  List:724122119}    Rehab Therapies: {THERAPEUTIC INTERVENTION:7558389297}  Weight Bearing Status/Restrictions: { CC Weight Bearin}  Other Medical Equipment (for information only, NOT a DME order):  {EQUIPMENT:128189414}  Other Treatments: ***    Patient's personal belongings (please select all that are sent with patient):  {CHP DME Belongings:909963934}    RN SIGNATURE:  {Esignature:338841445}    CASE MANAGEMENT/SOCIAL WORK SECTION    Inpatient Status Date: ***    Readmission Risk Assessment Score:  Readmission Risk              Risk of Unplanned Readmission:  0           Discharging to Facility/ Agency   Name:   Address:  Phone:  Fax:    Dialysis Facility (if applicable)   Name:  Address:  Dialysis Schedule:  Phone:  Fax:    / signature: {Esignature:417396177}    PHYSICIAN SECTION    Prognosis: {Prognosis:3418752817}    Condition at Discharge: { Patient Condition:731209218}    Rehab Potential (if transferring to Rehab): {Prognosis:3498376921}    Recommended Labs or Other Treatments After Discharge: ***    Physician Certification: I certify the above information and transfer of Shashi Rodriguez  is necessary for the continuing treatment of the diagnosis listed and that he requires {Admit to Appropriate Level of Care:41657} for {GREATER/LESS:877192004} 30 days.     Update Admission H&P: {CHP DME Changes in HandP:978410915}    PHYSICIAN SIGNATURE:  {Esignature:350824187}

## 2024-06-05 NOTE — ED PROVIDER NOTES
[facility] Emergency Department  [address]  [place] [address]  Phone: [phone]  Fax: [phone]      Attending Physician Attestation          CHIEF COMPLAINT       Chief Complaint   Patient presents with    Urinary Retention     Durand removed approx 16 hours ago, unable to void since then       DIAGNOSTIC RESULTS     LABS:  Labs Reviewed - No data to display    All other labs were within normal range or not returned as of this dictation.    RADIOLOGY:  No orders to display         EMERGENCY DEPARTMENT COURSE:   Vitals:    Vitals:    06/05/24 0857   BP: (!) 162/64   Pulse: 76   Resp: 19   Temp: 98.5 °F (36.9 °C)   TempSrc: Oral   SpO2: 96%   Weight: 88.5 kg (195 lb)   Height: 1.676 m (5' 6\")     -------------------------  BP: (!) 162/64, Temp: 98.5 °F (36.9 °C), Pulse: 76, Respirations: 19             PERTINENT ATTENDING PHYSICIAN COMMENTS:    I performed a history and physical examination of the patient and discussed management with the mid level provider. I reviewed the mid level provider's note and agree with the documented findings and plan of care. Any areas of disagreement are noted on the chart. I was personally present for the key portions of any procedures. I have documented in the chart those procedures where I was not present during the key portions. I have reviewed the emergency nurses triage note. I agree with the chief complaint, past medical history, past surgical history, allergies, medications, social and family history as documented unless otherwise noted below. Documentation of the HPI, Physical Exam and Medical Decision Making performed by mid level providers is based on my personal performance of the HPI, PE and MDM. For Residents, Physician Assistant/ Nurse Practitioner cases/documentation I have personally evaluated this patient and have completed at least one if not all key elements of the E/M (history, physical exam, and MDM). Additional findings  are as noted.    89-year-old male being sent to hospice today he came in with urinary retention due to his Aparicio catheter being removed at his facility. Aparicio reinserted and greater than 1000 ml voided initally and then aparicio clamped.     (Please note that portions of this note were completed with a voice recognition program.  Efforts were made to edit the dictations but occasionally words are mis-transcribed.)    Leigh Goode MD  Attending Emergency Medicine Physician        Leigh Goode MD  06/05/24 8402

## 2024-06-05 NOTE — ED PROVIDER NOTES
[facility] EMERGENCY DEPARTMENT  EMERGENCY DEPARTMENT ENCOUNTER      Pt Name: Shashi Rodriguez  MRN: [id number]  Birthdate 2/19/1932  Date of evaluation: 6/5/2024  Provider: KENZIE Jason CNP    CHIEF COMPLAINT       Chief Complaint   Patient presents with    Urinary Retention     Durand removed approx 16 hours ago, unable to void since then         HISTORY OF PRESENT ILLNESS   (Location/Symptom, Timing/Onset, Context/Setting, Quality, Duration, Modifying Factors, Severity)  Note limiting factors.   Shashi Rodriguez is a 89 y.o. male who presents to the emergency department for placement of Durand catheter due to urinary retention.  Patient had Durand removed approximately 16 hours ago.  He has been unable to void since having the Durand removed.  He complains of extreme pain to the suprapubic area and need to void.  patient is currently in hospice care.  Patient was recently admitted on 5/2824 through 6/4/24 for pancytopenia anemia cirrhosis with splenomegaly dysphagia with clinical aspiration pneumonia SOTERO hypotension elevated troponin and proBNP COPD and BPH.  Discharge planning show palliative evaluation hospice evaluation Monday 6/3/24 plan to go to SNF with hospice patient was discharged on cefepime and transition to Levaquin orally.  Patient declined any blood transfusions for low hemoglobin also declined any kind of bone marrow biopsy.        Nursing Notes were reviewed.    REVIEW OF SYSTEMS       Constitutional: No fevers or chills   HEENT: No sore throat, rhinorrhea, or earache   Eyes: No blurry vision or double vision no drainage   Cardiovascular: No chest pain or tachycardia   Respiratory: No wheezing or shortness of breath no cough   Gastrointestinal: No nausea, vomiting, diarrhea, constipation, or abdominal pain   : No hematuria or dysuria + urinary retention  Musculoskeletal: No swelling or pain   Skin: No rash   Neurological: No focal neurologic complaints, paresthesias, weakness,